# Patient Record
Sex: FEMALE | Race: WHITE | NOT HISPANIC OR LATINO | URBAN - METROPOLITAN AREA
[De-identification: names, ages, dates, MRNs, and addresses within clinical notes are randomized per-mention and may not be internally consistent; named-entity substitution may affect disease eponyms.]

---

## 2022-03-03 ENCOUNTER — LAB (OUTPATIENT)
Dept: LAB | Facility: HOSPITAL | Age: 37
End: 2022-03-03

## 2022-03-03 ENCOUNTER — OFFICE VISIT (OUTPATIENT)
Dept: INTERNAL MEDICINE | Facility: CLINIC | Age: 37
End: 2022-03-03

## 2022-03-03 VITALS
HEART RATE: 84 BPM | WEIGHT: 136.2 LBS | DIASTOLIC BLOOD PRESSURE: 70 MMHG | HEIGHT: 67 IN | SYSTOLIC BLOOD PRESSURE: 100 MMHG | OXYGEN SATURATION: 99 % | BODY MASS INDEX: 21.38 KG/M2 | TEMPERATURE: 98.6 F

## 2022-03-03 DIAGNOSIS — Z11.59 NEED FOR HEPATITIS C SCREENING TEST: ICD-10-CM

## 2022-03-03 DIAGNOSIS — Z23 NEED FOR HPV VACCINE: ICD-10-CM

## 2022-03-03 DIAGNOSIS — E05.00 GRAVES DISEASE: Chronic | ICD-10-CM

## 2022-03-03 DIAGNOSIS — Z00.00 WELL ADULT EXAM: Primary | ICD-10-CM

## 2022-03-03 PROBLEM — M79.89 SOFT TISSUE MASS: Status: ACTIVE | Noted: 2020-11-03

## 2022-03-03 PROBLEM — M79.89 SOFT TISSUE MASS: Status: RESOLVED | Noted: 2020-11-03 | Resolved: 2022-03-03

## 2022-03-03 PROBLEM — G44.019 EPISODIC CLUSTER HEADACHE, NOT INTRACTABLE: Status: RESOLVED | Noted: 2020-11-03 | Resolved: 2022-03-03

## 2022-03-03 PROBLEM — G43.909 MIGRAINE WITHOUT STATUS MIGRAINOSUS, NOT INTRACTABLE: Status: ACTIVE | Noted: 2020-11-03

## 2022-03-03 PROBLEM — Z87.898 HISTORY OF HEADACHE: Status: ACTIVE | Noted: 2020-11-03

## 2022-03-03 PROBLEM — G44.019 EPISODIC CLUSTER HEADACHE, NOT INTRACTABLE: Status: ACTIVE | Noted: 2020-11-03

## 2022-03-03 LAB
HCV AB SER DONR QL: NORMAL
T4 FREE SERPL-MCNC: 1.63 NG/DL (ref 0.93–1.7)
TSH SERPL DL<=0.05 MIU/L-ACNC: 0.52 UIU/ML (ref 0.27–4.2)

## 2022-03-03 PROCEDURE — 90471 IMMUNIZATION ADMIN: CPT | Performed by: STUDENT IN AN ORGANIZED HEALTH CARE EDUCATION/TRAINING PROGRAM

## 2022-03-03 PROCEDURE — 90651 9VHPV VACCINE 2/3 DOSE IM: CPT | Performed by: STUDENT IN AN ORGANIZED HEALTH CARE EDUCATION/TRAINING PROGRAM

## 2022-03-03 PROCEDURE — 84443 ASSAY THYROID STIM HORMONE: CPT

## 2022-03-03 PROCEDURE — 99385 PREV VISIT NEW AGE 18-39: CPT | Performed by: STUDENT IN AN ORGANIZED HEALTH CARE EDUCATION/TRAINING PROGRAM

## 2022-03-03 PROCEDURE — 84439 ASSAY OF FREE THYROXINE: CPT

## 2022-03-03 PROCEDURE — 86803 HEPATITIS C AB TEST: CPT

## 2022-03-03 RX ORDER — NORGESTIMATE AND ETHINYL ESTRADIOL 7DAYSX3 28
KIT ORAL
COMMUNITY
Start: 2022-01-23 | End: 2022-05-31

## 2022-03-03 RX ORDER — MELATONIN
1000 DAILY
COMMUNITY

## 2022-03-03 RX ORDER — LEVOTHYROXINE SODIUM 0.1 MG/1
1 TABLET ORAL
COMMUNITY
Start: 2022-01-02 | End: 2022-05-31 | Stop reason: SDUPTHER

## 2022-03-03 NOTE — PROGRESS NOTES
"Chief Complaint  Denia Rodriguez is a 36 y.o. female presenting for Annual Exam (establish care) and thyroid removed (referral to Endocrinology).     From [facility]. Moved to [place] May 2021 for her 's work.  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 Has [employer] in general management. No children.    Patient has a past medical history of Graves' disease with eye symptoms, s/p total thyroidectomy January 2021 due to labile thyroid levels.  She has been followed by endocrinology and ophthalmology in [place].    History of Present Illness  Patient is here to establish care after she and her  relocated to [place] in May 2021.    She is overall and good health, she currently has no complaints.  She had a left wrist dorsal ganglion cyst that was removed in January 2021 and also had her thyroidectomy at the same time.  No other surgeries.    Patient has received 2 doses of Gardasil and would like a third dose today.  She is COVID-19 vaccinated, but not received booster yet and declines at this time.  Also declines offer for flu vaccine.  She believes she had the Tdap for the last 2 years.    Patient has upcoming appointment with OB/GYN.  She also has upcoming appointment with ophthalmology and she is requesting referral to endocrinology for management of her thyroid condition and potentially also in setting of possible future conception.    The following portions of the patient's history were reviewed and updated as appropriate: allergies, current medications, past family history, past medical history, past social history, past surgical history and problem list.    Objective  /70 (BP Location: Left arm, Patient Position: Sitting, Cuff Size: Adult)   Pulse 84   Temp 98.6 °F (37 °C) (Temporal)   Ht 170 cm (66.93\")   Wt 61.8 kg (136 lb 3.2 oz)   SpO2 99%   BMI 21.38 kg/m²     Physical Exam  Vitals reviewed.   Constitutional:       Appearance: " Normal appearance.   HENT:      Head: Normocephalic and atraumatic.      Nose: No congestion.   Eyes:      Extraocular Movements: Extraocular movements intact.      Conjunctiva/sclera: Conjunctivae normal.   Neck:      Comments: Transverse scar of the lower anterior neck in the midline.  No palpable thyroid or nodules in this area.  Cardiovascular:      Rate and Rhythm: Normal rate and regular rhythm.      Heart sounds: Normal heart sounds. No murmur heard.      Pulmonary:      Effort: Pulmonary effort is normal.      Breath sounds: Normal breath sounds.   Abdominal:      General: There is no distension.      Palpations: Abdomen is soft. There is no mass.      Tenderness: There is no abdominal tenderness.   Musculoskeletal:      Cervical back: Neck supple. No tenderness.      Right lower leg: No edema.      Left lower leg: No edema.   Lymphadenopathy:      Cervical: No cervical adenopathy.   Skin:     General: Skin is warm and dry.   Neurological:      Mental Status: She is alert and oriented to person, place, and time. Mental status is at baseline.   Psychiatric:         Behavior: Behavior normal.         Thought Content: Thought content normal.         Assessment/Plan   1. Well adult exam  Counseled on recommendationsfor Tdap every 10 years.  Also discussed annual flu shot.  Counseled on official recommendations for exercise, including moderate intensity exercise 30 minutes a day, 5 days a week, total 2.5 hours weekly.  Patient has upcoming appointment with OB/GYN for Pap.  Patient states her lipid levels were normal in the past and defers recheck today.  Patient is nonfasting.    2. Graves disease  Refer to endocrinology as requested, will check thyroid panel today.  No follow-up appointment with me in this setting.  - Ambulatory Referral to Endocrinology  - TSH; Future  - T4, Free; Future    3. Need for HPV vaccine  Dose 3/3 administered today  - HPV Vaccine QuadriValent 3 Dose IM    4. Need for hepatitis C  screening test  We will screen as recommended.  - Hepatitis C Antibody; Future    Patient's Body mass index is 21.38 kg/m². indicating that she is within normal range (BMI 18.5-24.9). No BMI management plan needed..      Return in about 1 year (around 3/3/2023) for Annual physical.    Future Appointments       Provider Department Center    3/6/2023 8:00 AM Bentley Doll MD [facility] INTERNAL MEDICINE [facility]        Bentley Doll MD  Family Medicine  03/03/2022

## 2022-05-31 ENCOUNTER — OFFICE VISIT (OUTPATIENT)
Dept: ENDOCRINOLOGY | Facility: CLINIC | Age: 37
End: 2022-05-31

## 2022-05-31 VITALS
OXYGEN SATURATION: 98 % | BODY MASS INDEX: 20.88 KG/M2 | DIASTOLIC BLOOD PRESSURE: 66 MMHG | SYSTOLIC BLOOD PRESSURE: 104 MMHG | WEIGHT: 133 LBS | HEART RATE: 68 BPM | HEIGHT: 67 IN

## 2022-05-31 DIAGNOSIS — E07.9 THYROID EYE DISEASE: ICD-10-CM

## 2022-05-31 DIAGNOSIS — E89.0 POSTOPERATIVE HYPOTHYROIDISM: Primary | ICD-10-CM

## 2022-05-31 DIAGNOSIS — H57.89 THYROID EYE DISEASE: ICD-10-CM

## 2022-05-31 PROBLEM — H02.539 EYELID RETRACTION OR LAG: Status: ACTIVE | Noted: 2022-04-20

## 2022-05-31 PROBLEM — H02.206 LAGOPHTHALMOS OF EYELIDS OF BOTH EYES: Status: ACTIVE | Noted: 2022-04-20

## 2022-05-31 PROBLEM — H02.203 LAGOPHTHALMOS OF EYELIDS OF BOTH EYES: Status: ACTIVE | Noted: 2022-04-20

## 2022-05-31 PROBLEM — H05.20 PROPTOSIS: Status: ACTIVE | Noted: 2022-04-20

## 2022-05-31 PROCEDURE — 99244 OFF/OP CNSLTJ NEW/EST MOD 40: CPT | Performed by: INTERNAL MEDICINE

## 2022-05-31 RX ORDER — LEVOTHYROXINE SODIUM 0.1 MG/1
100 TABLET ORAL
Qty: 90 TABLET | Refills: 1 | Status: SHIPPED | OUTPATIENT
Start: 2022-05-31 | End: 2022-07-11 | Stop reason: SDUPTHER

## 2022-05-31 NOTE — PROGRESS NOTES
Chief Complaint   Patient presents with   • Graves' Disease        Referring Provider  Bentley Doll MD     HPI   Denia Rodriguez is a 36 y.o. female had concerns including Graves' Disease.     Patient here today as a new consult for history of postoperative hypothyroidism status post thyroidectomy for Graves' disease.  Surgery was completed on 1/7/2021.  She is currently on levothyroxine 100 mcg daily.  Is following with oculoplastics for Graves' ophthalmopathy.  Currently takes selenium one pill daily as well as vitamin D daily.    Graves disease was diagnosed in 5/2019 - had weight loss, irritation and dryness of her eyes, tachycardia, weakness.   Was on methimazole for some time but her levels difficult to control. Then elected for thyroidectomy.     Just started trying for pregnancy recently.     Her mom had Graves disease and thyroid nodule. Had partial thyroidectomy - unsure if it was cancer.     Past Medical History:   Diagnosis Date   • Graves disease 05/11/2020    Was on methimazole and atenolol, followed with [facility]. - s/p thyroidectomy - followed with ophthalmologist at [facility].   • History of headache 11/03/2020    Resolved after thyroidectomy 1/2021   • Hypothyroidism     postoperative, thyroidectomy 1/7/21 for Graves disease     Past Surgical History:   Procedure Laterality Date   • GANGLION CYST EXCISION Left 01/2021    Dorsal wrist   • THYROIDECTOMY  01/2021    For Graves disease, difficult to manage      Family History   Problem Relation Age of Onset   • Arthritis Mother    • Thyroid disease Mother    • Mental illness Maternal Uncle    • Mental illness Paternal Aunt    • Cancer Paternal Aunt    • Obesity Paternal Aunt    • Arthritis Maternal Grandmother       Social History     Socioeconomic History   • Marital status:    Tobacco Use   • Smoking status: Never Smoker   • Smokeless tobacco: Never Used   Vaping Use   • Vaping Use: Never used   Substance and Sexual Activity   •  "Alcohol use: Yes     Comment: 3 weekly   • Drug use: Never   • Sexual activity: Defer      No Known Allergies   Current Outpatient Medications on File Prior to Visit   Medication Sig Dispense Refill   • cholecalciferol (VITAMIN D3) 25 MCG (1000 UT) tablet Take 1,000 Units by mouth Daily.     • SELENIUM PO Take  by mouth Daily.     • [DISCONTINUED] levothyroxine (SYNTHROID, LEVOTHROID) 100 MCG tablet Take 1 tablet by mouth Every Morning Before Breakfast.     • [DISCONTINUED] Tri-Estarylla 0.18/0.215/0.25 MG-35 MCG per tablet        No current facility-administered medications on file prior to visit.        Review of Systems   Constitutional: Negative.    HENT: Negative.    Eyes:        Dryness   Respiratory: Negative.    Cardiovascular: Negative.    Gastrointestinal: Negative.    Endocrine: Negative.    Genitourinary: Negative.    Musculoskeletal: Negative.    Skin: Negative.    Allergic/Immunologic: Negative.    Neurological: Negative.    Hematological: Negative.    Psychiatric/Behavioral: Negative.         /66   Pulse 68   Ht 170.2 cm (67\")   Wt 60.3 kg (133 lb)   SpO2 98%   BMI 20.83 kg/m²      Physical Exam    Constitutional:  well developed; well nourished  no acute distress   ENT/Thyroid: Thyroid surgically absent   Eyes: EOM intact  Conjunctiva: clear, bilateral mild proptosis, lid lag, no conjunctival injection   Respiratory:  breathing is unlabored  clear to auscultation bilaterally   Cardiovascular:  regular rate and rhythm, S1, S2 normal, no murmur, click, rub or gallop   Chest:  Not performed.   Abdomen: Not performed.   : Not performed.   Musculoskeletal: negative findings:  ROM of all joints is normal, no deformities present   Skin: dry and warm   Neuro: normal without focal findings and mental status, speech normal, alert and oriented x3   Psych: oriented to time, place and person, mood and affect are within normal limits     Labs/Imaging  CMP  Lab Results   Component Value Date    BUN 10 " 06/24/2021    CREATININE 0.77 06/24/2021    EGFRIFNONA >60 06/24/2021    EGFRIFAFRI >60 06/24/2021    BCR 13 06/24/2021    K 4.1 06/24/2021    CO2 25 06/24/2021    CALCIUM 9.1 06/24/2021    ALBUMIN 4.1 01/27/2021    AST 16 01/27/2021    ALT 18 01/27/2021        CBC w/DIFF   Lab Results   Component Value Date    WBC 6.1 01/27/2021    RBC 4.21 01/27/2021    HGB 12.4 (L) 01/27/2021    HCT 37.5 01/27/2021    MCV 89 01/27/2021    MCH 29.5 01/27/2021    MCHC 33.1 01/27/2021    RDW 12.4 01/27/2021    MPV 9.0 01/27/2021     01/27/2021    NEUTRORELPCT 50.0 01/27/2021    LYMPHORELPCT 40.0 01/27/2021    MONORELPCT 7.0 01/27/2021    EOSRELPCT 2.0 01/27/2021    BASORELPCT 0.7 01/27/2021    AUTOIGPER 0.3 01/27/2021    NEUTROABS 3.06 01/27/2021    LYMPHSABS 2.45 01/27/2021    MONOSABS 0.43 01/27/2021    EOSABS 0.12 01/27/2021    BASOSABS 0.04 01/27/2021    AUTOIGNUM 0.02 01/27/2021    NRBC 0.0 01/27/2021     TSH  Lab Results   Component Value Date    TSH 0.516 03/03/2022    TSH 0.86 01/27/2021    TSH <0.02 (L) 12/29/2020     T4  Lab Results   Component Value Date    FREET4 1.63 03/03/2022    FREET4 1.6 01/27/2021    FREET4 0.9 12/29/2020     T3  Lab Results   Component Value Date    T3FREE >32.55 (H) 05/17/2019       Assessment and Plan    Diagnoses and all orders for this visit:    1. Postoperative hypothyroidism (Primary)  Status post complete thyroidectomy 1/7/2021 for Graves' hyperthyroidism.  Clinically and biochemically euthyroid on levothyroxine 100 mcg daily.  Taking daily in the morning as directed, no missed doses.  TFTs in an optimal range from March.  Refill was sent to the pharmacy.   Patient is now actively trying for pregnancy.  I counseled on management of hypothyroidism in pregnancy - Double the dose of levothyroxine two days per week and contact the office as soon as a home pregnancy test is positive for close monitoring of TFTs in pregnancy. Can check TSHrAb in third trimester.   -     levothyroxine  (SYNTHROID, LEVOTHROID) 100 MCG tablet; Take 1 tablet by mouth Every Morning Before Breakfast.  Dispense: 90 tablet; Refill: 1    2. Thyroid eye disease  On selenium and vitamin D.  Following with Dr. Brower every 6 months.  No active Graves' eye disease noted during the visit today.  Continue follow-up with Dr. Brower.       Return in about 6 months (around 11/30/2022) for next scheduled follow up. The patient was instructed to contact the clinic with any interval questions or concerns.    Florina Hull, DO   Endocrinologist    Please note that portions of this note were completed with a voice recognition program.

## 2022-07-11 ENCOUNTER — TELEPHONE (OUTPATIENT)
Dept: ENDOCRINOLOGY | Facility: CLINIC | Age: 37
End: 2022-07-11

## 2022-07-11 DIAGNOSIS — E89.0 POSTOPERATIVE HYPOTHYROIDISM: Primary | ICD-10-CM

## 2022-07-11 RX ORDER — LEVOTHYROXINE SODIUM 0.1 MG/1
100 TABLET ORAL
Qty: 90 TABLET | Refills: 1
Start: 2022-07-11 | End: 2022-07-25 | Stop reason: SDUPTHER

## 2022-07-11 NOTE — TELEPHONE ENCOUNTER
Called pt and her positive test was a home test. She has appt this Friday with OB to confirm.    Please advise.

## 2022-07-11 NOTE — TELEPHONE ENCOUNTER
Patient has a positive pregnancy test and wants to know how to adjust her thyroid medication.  Please advise?  Thank you.

## 2022-07-11 NOTE — TELEPHONE ENCOUNTER
Congratulations!    Take 2 additional pills of levothyroxine per week (2 days a week- take 2 tabs).  Lets recheck labs now.  I will put a lab order in the system.

## 2022-07-14 ENCOUNTER — LAB (OUTPATIENT)
Dept: LAB | Facility: HOSPITAL | Age: 37
End: 2022-07-14

## 2022-07-14 ENCOUNTER — LAB (OUTPATIENT)
Dept: ENDOCRINOLOGY | Facility: CLINIC | Age: 37
End: 2022-07-14

## 2022-07-14 ENCOUNTER — TRANSCRIBE ORDERS (OUTPATIENT)
Dept: LAB | Facility: HOSPITAL | Age: 37
End: 2022-07-14

## 2022-07-14 DIAGNOSIS — E89.0 POSTOPERATIVE HYPOTHYROIDISM: ICD-10-CM

## 2022-07-14 DIAGNOSIS — N92.6 IRREGULAR MENSTRUAL CYCLE: Primary | ICD-10-CM

## 2022-07-14 DIAGNOSIS — N92.6 IRREGULAR MENSTRUAL CYCLE: ICD-10-CM

## 2022-07-14 LAB
HCG INTACT+B SERPL-ACNC: 9980 MIU/ML
T4 SERPL-MCNC: 10.3 MCG/DL (ref 4.5–11.7)
TSH SERPL DL<=0.05 MIU/L-ACNC: 1.37 UIU/ML (ref 0.27–4.2)

## 2022-07-14 PROCEDURE — 36415 COLL VENOUS BLD VENIPUNCTURE: CPT

## 2022-07-14 PROCEDURE — 84443 ASSAY THYROID STIM HORMONE: CPT | Performed by: INTERNAL MEDICINE

## 2022-07-14 PROCEDURE — 84702 CHORIONIC GONADOTROPIN TEST: CPT | Performed by: ADVANCED PRACTICE MIDWIFE

## 2022-07-14 PROCEDURE — 84436 ASSAY OF TOTAL THYROXINE: CPT | Performed by: INTERNAL MEDICINE

## 2022-07-16 ENCOUNTER — TRANSCRIBE ORDERS (OUTPATIENT)
Dept: LAB | Facility: HOSPITAL | Age: 37
End: 2022-07-16

## 2022-07-16 ENCOUNTER — LAB (OUTPATIENT)
Dept: LAB | Facility: HOSPITAL | Age: 37
End: 2022-07-16

## 2022-07-16 DIAGNOSIS — N92.6 IRREGULAR MENSTRUAL CYCLE: Primary | ICD-10-CM

## 2022-07-16 DIAGNOSIS — N92.6 IRREGULAR MENSTRUAL CYCLE: ICD-10-CM

## 2022-07-16 DIAGNOSIS — E05.00 BASEDOW'S DISEASE: ICD-10-CM

## 2022-07-16 LAB — HCG INTACT+B SERPL-ACNC: NORMAL MIU/ML

## 2022-07-16 PROCEDURE — 36415 COLL VENOUS BLD VENIPUNCTURE: CPT

## 2022-07-16 PROCEDURE — 84702 CHORIONIC GONADOTROPIN TEST: CPT

## 2022-07-25 DIAGNOSIS — E89.0 POSTOPERATIVE HYPOTHYROIDISM: ICD-10-CM

## 2022-07-25 RX ORDER — LEVOTHYROXINE SODIUM 0.1 MG/1
TABLET ORAL
Qty: 100 TABLET | Refills: 1 | Status: SHIPPED | OUTPATIENT
Start: 2022-07-25 | End: 2022-08-16 | Stop reason: SDUPTHER

## 2022-08-15 ENCOUNTER — TRANSCRIBE ORDERS (OUTPATIENT)
Dept: LAB | Facility: HOSPITAL | Age: 37
End: 2022-08-15

## 2022-08-15 ENCOUNTER — LAB (OUTPATIENT)
Dept: LAB | Facility: HOSPITAL | Age: 37
End: 2022-08-15

## 2022-08-15 DIAGNOSIS — Z3A.09 9 WEEKS GESTATION OF PREGNANCY: ICD-10-CM

## 2022-08-15 DIAGNOSIS — Z3A.09 9 WEEKS GESTATION OF PREGNANCY: Primary | ICD-10-CM

## 2022-08-15 DIAGNOSIS — E89.0 POSTOPERATIVE HYPOTHYROIDISM: ICD-10-CM

## 2022-08-15 LAB
ABO GROUP BLD: NORMAL
AMPHET+METHAMPHET UR QL: NEGATIVE
AMPHETAMINES UR QL: NEGATIVE
BARBITURATES UR QL SCN: NEGATIVE
BENZODIAZ UR QL SCN: NEGATIVE
BILIRUB UR QL STRIP: NEGATIVE
BUPRENORPHINE SERPL-MCNC: NEGATIVE NG/ML
CANNABINOIDS SERPL QL: NEGATIVE
CLARITY UR: CLEAR
COCAINE UR QL: NEGATIVE
COLOR UR: YELLOW
DEPRECATED RDW RBC AUTO: 43.4 FL (ref 37–54)
ERYTHROCYTE [DISTWIDTH] IN BLOOD BY AUTOMATED COUNT: 13.3 % (ref 12.3–15.4)
GLUCOSE UR STRIP-MCNC: NEGATIVE MG/DL
HBV SURFACE AG SERPL QL IA: NORMAL
HCT VFR BLD AUTO: 39.9 % (ref 34–46.6)
HGB BLD-MCNC: 13.2 G/DL (ref 12–15.9)
HGB UR QL STRIP.AUTO: NEGATIVE
HIV1+2 AB SER QL: NORMAL
KETONES UR QL STRIP: NEGATIVE
LEUKOCYTE ESTERASE UR QL STRIP.AUTO: ABNORMAL
MCH RBC QN AUTO: 29.8 PG (ref 26.6–33)
MCHC RBC AUTO-ENTMCNC: 33.1 G/DL (ref 31.5–35.7)
MCV RBC AUTO: 90.1 FL (ref 79–97)
METHADONE UR QL SCN: NEGATIVE
NITRITE UR QL STRIP: NEGATIVE
OPIATES UR QL: NEGATIVE
OXYCODONE UR QL SCN: NEGATIVE
PCP UR QL SCN: NEGATIVE
PH UR STRIP.AUTO: 6 [PH] (ref 5–8)
PLATELET # BLD AUTO: 314 10*3/MM3 (ref 140–450)
PMV BLD AUTO: 9.7 FL (ref 6–12)
PROPOXYPH UR QL: NEGATIVE
PROT UR QL STRIP: NEGATIVE
RBC # BLD AUTO: 4.43 10*6/MM3 (ref 3.77–5.28)
RH BLD: POSITIVE
SP GR UR STRIP: 1.02 (ref 1–1.03)
TRICYCLICS UR QL SCN: NEGATIVE
UROBILINOGEN UR QL STRIP: ABNORMAL
WBC NRBC COR # BLD: 6.78 10*3/MM3 (ref 3.4–10.8)

## 2022-08-15 PROCEDURE — 84443 ASSAY THYROID STIM HORMONE: CPT

## 2022-08-15 PROCEDURE — 87591 N.GONORRHOEAE DNA AMP PROB: CPT

## 2022-08-15 PROCEDURE — G0432 EIA HIV-1/HIV-2 SCREEN: HCPCS

## 2022-08-15 PROCEDURE — 80306 DRUG TEST PRSMV INSTRMNT: CPT

## 2022-08-15 PROCEDURE — 36415 COLL VENOUS BLD VENIPUNCTURE: CPT

## 2022-08-15 PROCEDURE — 87340 HEPATITIS B SURFACE AG IA: CPT

## 2022-08-15 PROCEDURE — 84436 ASSAY OF TOTAL THYROXINE: CPT

## 2022-08-15 PROCEDURE — 87086 URINE CULTURE/COLONY COUNT: CPT

## 2022-08-15 PROCEDURE — 86762 RUBELLA ANTIBODY: CPT

## 2022-08-15 PROCEDURE — 86780 TREPONEMA PALLIDUM: CPT

## 2022-08-15 PROCEDURE — 81001 URINALYSIS AUTO W/SCOPE: CPT

## 2022-08-15 PROCEDURE — 86900 BLOOD TYPING SEROLOGIC ABO: CPT

## 2022-08-15 PROCEDURE — 87491 CHLMYD TRACH DNA AMP PROBE: CPT

## 2022-08-15 PROCEDURE — 86787 VARICELLA-ZOSTER ANTIBODY: CPT

## 2022-08-15 PROCEDURE — 86901 BLOOD TYPING SEROLOGIC RH(D): CPT

## 2022-08-15 PROCEDURE — 86803 HEPATITIS C AB TEST: CPT

## 2022-08-15 PROCEDURE — 85027 COMPLETE CBC AUTOMATED: CPT

## 2022-08-16 DIAGNOSIS — E89.0 POSTOPERATIVE HYPOTHYROIDISM: ICD-10-CM

## 2022-08-16 LAB
BACTERIA UR QL AUTO: ABNORMAL /HPF
HCV AB SER DONR QL: NORMAL
HYALINE CASTS UR QL AUTO: ABNORMAL /LPF
RBC # UR STRIP: ABNORMAL /HPF
REF LAB TEST METHOD: ABNORMAL
SQUAMOUS #/AREA URNS HPF: ABNORMAL /HPF
T4 SERPL-MCNC: 14.3 MCG/DL (ref 4.5–11.7)
TSH SERPL DL<=0.05 MIU/L-ACNC: 0.63 UIU/ML (ref 0.27–4.2)
WBC # UR STRIP: ABNORMAL /HPF

## 2022-08-16 RX ORDER — LEVOTHYROXINE SODIUM 0.12 MG/1
125 TABLET ORAL DAILY
Qty: 30 TABLET | Refills: 3 | Status: SHIPPED | OUTPATIENT
Start: 2022-08-16 | End: 2022-09-13 | Stop reason: SDUPTHER

## 2022-08-17 LAB
BACTERIA SPEC AEROBE CULT: NO GROWTH
RUBV IGG SERPL IA-ACNC: <0.9 INDEX
T PALLIDUM AB SER QL IF: NON REACTIVE
VZV IGG SER IA-ACNC: 344 INDEX

## 2022-08-19 LAB
C TRACH RRNA SPEC QL NAA+PROBE: NEGATIVE
N GONORRHOEA RRNA SPEC QL NAA+PROBE: NEGATIVE

## 2022-09-12 ENCOUNTER — LAB (OUTPATIENT)
Dept: LAB | Facility: HOSPITAL | Age: 37
End: 2022-09-12

## 2022-09-12 DIAGNOSIS — E89.0 POSTOPERATIVE HYPOTHYROIDISM: ICD-10-CM

## 2022-09-12 PROCEDURE — 84443 ASSAY THYROID STIM HORMONE: CPT

## 2022-09-12 PROCEDURE — 84436 ASSAY OF TOTAL THYROXINE: CPT

## 2022-09-12 PROCEDURE — 36415 COLL VENOUS BLD VENIPUNCTURE: CPT

## 2022-09-13 DIAGNOSIS — E89.0 POSTOPERATIVE HYPOTHYROIDISM: ICD-10-CM

## 2022-09-13 LAB
T4 SERPL-MCNC: 16.2 MCG/DL (ref 4.5–11.7)
TSH SERPL DL<=0.05 MIU/L-ACNC: 0.26 UIU/ML (ref 0.27–4.2)

## 2022-09-13 RX ORDER — LEVOTHYROXINE SODIUM 112 UG/1
112 TABLET ORAL DAILY
Qty: 30 TABLET | Refills: 4 | Status: SHIPPED | OUTPATIENT
Start: 2022-09-13 | End: 2022-10-26 | Stop reason: SDUPTHER

## 2022-10-21 ENCOUNTER — LAB (OUTPATIENT)
Dept: LAB | Facility: HOSPITAL | Age: 37
End: 2022-10-21

## 2022-10-21 ENCOUNTER — TRANSCRIBE ORDERS (OUTPATIENT)
Dept: LAB | Facility: HOSPITAL | Age: 37
End: 2022-10-21

## 2022-10-21 DIAGNOSIS — Z34.82 PRENATAL CARE, SUBSEQUENT PREGNANCY, SECOND TRIMESTER: Primary | ICD-10-CM

## 2022-10-21 LAB
T4 FREE SERPL-MCNC: 1.47 NG/DL (ref 0.93–1.7)
TSH SERPL DL<=0.05 MIU/L-ACNC: 0.89 UIU/ML (ref 0.27–4.2)

## 2022-10-21 PROCEDURE — 84439 ASSAY OF FREE THYROXINE: CPT | Performed by: OBSTETRICS & GYNECOLOGY

## 2022-10-21 PROCEDURE — 84443 ASSAY THYROID STIM HORMONE: CPT | Performed by: OBSTETRICS & GYNECOLOGY

## 2022-10-21 PROCEDURE — 36415 COLL VENOUS BLD VENIPUNCTURE: CPT | Performed by: OBSTETRICS & GYNECOLOGY

## 2022-10-26 ENCOUNTER — OFFICE VISIT (OUTPATIENT)
Dept: ENDOCRINOLOGY | Facility: CLINIC | Age: 37
End: 2022-10-26

## 2022-10-26 VITALS
BODY MASS INDEX: 22.91 KG/M2 | DIASTOLIC BLOOD PRESSURE: 60 MMHG | HEIGHT: 67 IN | HEART RATE: 83 BPM | SYSTOLIC BLOOD PRESSURE: 106 MMHG | OXYGEN SATURATION: 97 % | WEIGHT: 146 LBS

## 2022-10-26 DIAGNOSIS — E89.0 POSTOPERATIVE HYPOTHYROIDISM: Primary | ICD-10-CM

## 2022-10-26 PROCEDURE — 99213 OFFICE O/P EST LOW 20 MIN: CPT | Performed by: INTERNAL MEDICINE

## 2022-10-26 RX ORDER — LEVOTHYROXINE SODIUM 112 UG/1
112 TABLET ORAL DAILY
Qty: 90 TABLET | Refills: 1 | Status: SHIPPED | OUTPATIENT
Start: 2022-10-26 | End: 2022-12-14 | Stop reason: SDUPTHER

## 2022-10-26 NOTE — PROGRESS NOTES
"Chief Complaint   Patient presents with   • Hypothyroidism        HPI   Denia Rodriguez is a 37 y.o. female had concerns including Hypothyroidism.      Patient currently 20 weeks pregnant.    Is on levothyroxine 112 mcg daily.  TFTs few days ago were normal.  She is taking the levothyroxine with her prenatal in the mornings.  Denies missed doses.    The following portions of the patient's history were reviewed and updated as appropriate: allergies, current medications, past family history, past medical history, past social history, past surgical history and problem list.    Review of Systems   Constitutional: Negative.    Endocrine: Negative.         /60 (BP Location: Left arm, Patient Position: Sitting, Cuff Size: Adult)   Pulse 83   Ht 170.2 cm (67\")   Wt 66.2 kg (146 lb)   SpO2 97%   BMI 22.87 kg/m²      Physical Exam  Vitals reviewed.   Constitutional:       Appearance: Normal appearance.   Cardiovascular:      Rate and Rhythm: Normal rate.   Pulmonary:      Effort: Pulmonary effort is normal.   Neurological:      General: No focal deficit present.      Mental Status: She is alert. Mental status is at baseline.   Psychiatric:         Mood and Affect: Mood normal.         Behavior: Behavior normal.              LABS AND IMAGING   CMP  Lab Results   Component Value Date    BUN 10 06/24/2021    CREATININE 0.77 06/24/2021    EGFRIFNONA >60 06/24/2021    EGFRIFAFRI >60 06/24/2021    BCR 13 06/24/2021    K 4.1 06/24/2021    CO2 25 06/24/2021    CALCIUM 9.1 06/24/2021    ALBUMIN 4.1 01/27/2021    AST 16 01/27/2021    ALT 18 01/27/2021        CBC w/DIFF   Lab Results   Component Value Date    WBC 6.78 08/15/2022    RBC 4.43 08/15/2022    HGB 13.2 08/15/2022    HCT 39.9 08/15/2022    MCV 90.1 08/15/2022    MCH 29.8 08/15/2022    MCHC 33.1 08/15/2022    RDW 13.3 08/15/2022    RDWSD 43.4 08/15/2022    MPV 9.7 08/15/2022     08/15/2022    NEUTRORELPCT 50.0 01/27/2021    LYMPHORELPCT 40.0 01/27/2021    " MONORELPCT 7.0 01/27/2021    EOSRELPCT 2.0 01/27/2021    BASORELPCT 0.7 01/27/2021    AUTOIGPER 0.3 01/27/2021    NEUTROABS 3.06 01/27/2021    LYMPHSABS 2.45 01/27/2021    MONOSABS 0.43 01/27/2021    EOSABS 0.12 01/27/2021    BASOSABS 0.04 01/27/2021    AUTOIGNUM 0.02 01/27/2021    NRBC 0.0 01/27/2021       TSH  Lab Results   Component Value Date    TSH 0.890 10/21/2022    TSH 0.256 (L) 09/12/2022    TSH 0.626 08/15/2022       T4  Lab Results   Component Value Date    FREET4 1.47 10/21/2022    FREET4 1.63 03/03/2022    FREET4 1.6 01/27/2021     Lab Results   Component Value Date    R2SVPHX 16.20 (H) 09/12/2022    A6DJLQC 14.30 (H) 08/15/2022    S2YPKKN 10.30 07/14/2022       T3  Lab Results   Component Value Date    T3FREE >32.55 (H) 05/17/2019     Assessment and Plan    Diagnoses and all orders for this visit:    1. Postoperative hypothyroidism (Primary)  Controlled at 20 weeks pregnant on levothyroxine 112 mcg daily.  Prepregnancy dose was 100 mcg daily.  Continue taking as she has been (currently taking with multivitamin, but with normal TFTs, no need to change).  Recheck in 3 months.  -     levothyroxine (SYNTHROID, LEVOTHROID) 112 MCG tablet; Take 1 tablet by mouth Daily.  Dispense: 90 tablet; Refill: 1  -     T4, Free; Future  -     TSH; Future         Return in about 3 months (around 1/26/2023) for next scheduled follow up. The patient was instructed to contact the clinic with any interval questions or concerns.    Florina Hull, DO   Endocrinologist    Please note that portions of this note were completed with a voice recognition program.

## 2022-11-22 ENCOUNTER — TELEPHONE (OUTPATIENT)
Dept: INTERNAL MEDICINE | Facility: CLINIC | Age: 37
End: 2022-11-22

## 2022-11-22 ENCOUNTER — OFFICE VISIT (OUTPATIENT)
Dept: INTERNAL MEDICINE | Facility: CLINIC | Age: 37
End: 2022-11-22

## 2022-11-22 VITALS
SYSTOLIC BLOOD PRESSURE: 106 MMHG | HEART RATE: 96 BPM | BODY MASS INDEX: 23.23 KG/M2 | WEIGHT: 148 LBS | TEMPERATURE: 98 F | HEIGHT: 67 IN | DIASTOLIC BLOOD PRESSURE: 68 MMHG

## 2022-11-22 DIAGNOSIS — J10.1 UPPER RESPIRATORY TRACT INFECTION DUE TO INFLUENZA A VIRUS: Primary | ICD-10-CM

## 2022-11-22 DIAGNOSIS — Z34.90 PREGNANCY, UNSPECIFIED GESTATIONAL AGE: ICD-10-CM

## 2022-11-22 LAB
EXPIRATION DATE: ABNORMAL
FLUAV AG UPPER RESP QL IA.RAPID: DETECTED
FLUBV AG UPPER RESP QL IA.RAPID: NOT DETECTED
INTERNAL CONTROL: ABNORMAL
Lab: ABNORMAL
SARS-COV-2 AG UPPER RESP QL IA.RAPID: NOT DETECTED

## 2022-11-22 PROCEDURE — 99214 OFFICE O/P EST MOD 30 MIN: CPT | Performed by: STUDENT IN AN ORGANIZED HEALTH CARE EDUCATION/TRAINING PROGRAM

## 2022-11-22 PROCEDURE — 87428 SARSCOV & INF VIR A&B AG IA: CPT | Performed by: STUDENT IN AN ORGANIZED HEALTH CARE EDUCATION/TRAINING PROGRAM

## 2022-11-22 NOTE — TELEPHONE ENCOUNTER
Patient called stating that both her and her  want to be tested for COVID and flu because they have fever and congestion. They had plans to travel to see family for thanksgiving    I let her know that Dr Doll had an 11:30 AM and 4:15 PM. She stated that since they would have to be seen they will look into If a local testing center will test them and she will call back if needed

## 2022-11-22 NOTE — PROGRESS NOTES
"Chief Complaint  Denia Rodriguez is a 37 y.o. female presenting for URI (Fatigue, fever, post nasal drip, X's 2 days   bodyaches X's 5 days ).     Patient has a past medical history of Graves' disease, postoperative hypothyroidism, proptosis and headaches.    History of Present Illness  Patient is here due to respiratory symptoms that started Friday, 11/18/2022.  Initially body aches, then cough, some yellow-clear phlegm, no wheezing but mild shortness of breath.  Appetite is good, tolerating p.o.  Also had fever last night.  She is pregnant 5 months with NICOLASA 3/15/2022.  Fetal movement present.  Of note her  had a cough last week.    The following portions of the patient's history were reviewed and updated as appropriate: allergies, current medications, past family history, past medical history, past social history, past surgical history and problem list.    Objective  /68 (BP Location: Left arm, Patient Position: Sitting, Cuff Size: Adult)   Pulse 96 Comment: irregular   skipped beat  Temp 98 °F (36.7 °C) (Temporal)   Ht 170.2 cm (67.01\")   Wt 67.1 kg (148 lb)   BMI 23.17 kg/m²     Physical Exam  Vitals reviewed.   Constitutional:       General: She is not in acute distress.     Appearance: Normal appearance. She is ill-appearing. She is not toxic-appearing or diaphoretic.   HENT:      Head: Normocephalic and atraumatic.      Right Ear: Tympanic membrane, ear canal and external ear normal. There is no impacted cerumen.      Left Ear: Tympanic membrane, ear canal and external ear normal. There is no impacted cerumen.      Nose: Congestion present.   Eyes:      Extraocular Movements: Extraocular movements intact.      Conjunctiva/sclera: Conjunctivae normal.   Cardiovascular:      Rate and Rhythm: Normal rate and regular rhythm.      Heart sounds: Normal heart sounds. No murmur heard.  Pulmonary:      Effort: Pulmonary effort is normal. No respiratory distress.      Breath sounds: Normal breath " sounds. No wheezing.   Musculoskeletal:      Cervical back: Neck supple.   Skin:     General: Skin is warm and dry.   Neurological:      Mental Status: She is alert and oriented to person, place, and time. Mental status is at baseline.   Psychiatric:         Behavior: Behavior normal.         Thought Content: Thought content normal.         Assessment/Plan   1. Upper respiratory tract infection due to influenza A virus  Confirmed positive influenza A.  Patient is pregnant and is not really immunosuppressed because of that.  Because of her pregnancy I would not recommend any prescription cough syrup.  Also treatment with antiviral like Tamiflu is not recommended past 48 hours since symptoms started.  Patient has systemic symptoms in form of body aches and fever, but is otherwise fairly well-appearing.  She can safely use saline nasal spray for relief of congestion, or Tylenol for any significant aches or fever.  Counseled on return to care if worsening symptoms, sometimes this can be concerning for secondary infection like pneumonia.  - POCT SARS-CoV-2 Antigen ROYAL    2. Pregnancy, unspecified gestational age  Continue follow-up with OB/GYN.      Return if symptoms worsen or fail to improve.    Future Appointments       Provider Department Center    1/26/2023 10:00 AM Florina Hull DO [facility] ENDOCRINOLOGY [facility]    3/6/2023 8:00 AM Bentley Doll MD [facility] INTERNAL MEDICINE [facility]            Bentley Doll MD  Family Medicine  11/22/2022

## 2022-12-12 ENCOUNTER — LAB (OUTPATIENT)
Dept: LAB | Facility: HOSPITAL | Age: 37
End: 2022-12-12

## 2022-12-12 ENCOUNTER — TRANSCRIBE ORDERS (OUTPATIENT)
Dept: LAB | Facility: HOSPITAL | Age: 37
End: 2022-12-12

## 2022-12-12 DIAGNOSIS — E03.9 MYXEDEMA HEART DISEASE: ICD-10-CM

## 2022-12-12 DIAGNOSIS — E03.9 MYXEDEMA HEART DISEASE: Primary | ICD-10-CM

## 2022-12-12 DIAGNOSIS — I51.9 MYXEDEMA HEART DISEASE: ICD-10-CM

## 2022-12-12 DIAGNOSIS — I51.9 MYXEDEMA HEART DISEASE: Primary | ICD-10-CM

## 2022-12-12 LAB
BLD GP AB SCN SERPL QL: NEGATIVE
GLUCOSE 1H P 100 G GLC PO SERPL-MCNC: 113 MG/DL (ref 65–139)

## 2022-12-12 PROCEDURE — 82950 GLUCOSE TEST: CPT

## 2022-12-12 PROCEDURE — 86850 RBC ANTIBODY SCREEN: CPT

## 2022-12-12 PROCEDURE — 84443 ASSAY THYROID STIM HORMONE: CPT

## 2022-12-12 PROCEDURE — 84439 ASSAY OF FREE THYROXINE: CPT

## 2022-12-12 PROCEDURE — 85027 COMPLETE CBC AUTOMATED: CPT

## 2022-12-12 PROCEDURE — 36415 COLL VENOUS BLD VENIPUNCTURE: CPT

## 2022-12-13 LAB
DEPRECATED RDW RBC AUTO: 41 FL (ref 37–54)
ERYTHROCYTE [DISTWIDTH] IN BLOOD BY AUTOMATED COUNT: 12.9 % (ref 12.3–15.4)
HCT VFR BLD AUTO: 35.1 % (ref 34–46.6)
HGB BLD-MCNC: 11.9 G/DL (ref 12–15.9)
MCH RBC QN AUTO: 29.6 PG (ref 26.6–33)
MCHC RBC AUTO-ENTMCNC: 33.9 G/DL (ref 31.5–35.7)
MCV RBC AUTO: 87.3 FL (ref 79–97)
PLATELET # BLD AUTO: 409 10*3/MM3 (ref 140–450)
PMV BLD AUTO: 8.9 FL (ref 6–12)
RBC # BLD AUTO: 4.02 10*6/MM3 (ref 3.77–5.28)
T4 FREE SERPL-MCNC: 1.43 NG/DL (ref 0.93–1.7)
TSH SERPL DL<=0.05 MIU/L-ACNC: 0.1 UIU/ML (ref 0.27–4.2)
WBC NRBC COR # BLD: 7.01 10*3/MM3 (ref 3.4–10.8)

## 2022-12-14 ENCOUNTER — PATIENT MESSAGE (OUTPATIENT)
Dept: ENDOCRINOLOGY | Facility: CLINIC | Age: 37
End: 2022-12-14

## 2022-12-14 DIAGNOSIS — E89.0 POSTOPERATIVE HYPOTHYROIDISM: ICD-10-CM

## 2022-12-14 RX ORDER — LEVOTHYROXINE SODIUM 0.1 MG/1
100 TABLET ORAL DAILY
Qty: 30 TABLET | Refills: 5 | Status: SHIPPED | OUTPATIENT
Start: 2022-12-14 | End: 2023-02-15 | Stop reason: SDUPTHER

## 2023-02-11 ENCOUNTER — LAB (OUTPATIENT)
Dept: LAB | Facility: HOSPITAL | Age: 38
End: 2023-02-11
Payer: COMMERCIAL

## 2023-02-11 DIAGNOSIS — E89.0 POSTOPERATIVE HYPOTHYROIDISM: ICD-10-CM

## 2023-02-11 LAB
T4 FREE SERPL-MCNC: 1.39 NG/DL (ref 0.93–1.7)
TSH SERPL DL<=0.05 MIU/L-ACNC: 0.46 UIU/ML (ref 0.27–4.2)

## 2023-02-11 PROCEDURE — 36415 COLL VENOUS BLD VENIPUNCTURE: CPT

## 2023-02-11 PROCEDURE — 84439 ASSAY OF FREE THYROXINE: CPT

## 2023-02-11 PROCEDURE — 84443 ASSAY THYROID STIM HORMONE: CPT

## 2023-02-15 ENCOUNTER — OFFICE VISIT (OUTPATIENT)
Dept: ENDOCRINOLOGY | Facility: CLINIC | Age: 38
End: 2023-02-15
Payer: COMMERCIAL

## 2023-02-15 VITALS
SYSTOLIC BLOOD PRESSURE: 100 MMHG | HEIGHT: 67 IN | BODY MASS INDEX: 23.54 KG/M2 | DIASTOLIC BLOOD PRESSURE: 66 MMHG | HEART RATE: 98 BPM | WEIGHT: 150 LBS | OXYGEN SATURATION: 98 %

## 2023-02-15 DIAGNOSIS — E89.0 POSTOPERATIVE HYPOTHYROIDISM: Primary | ICD-10-CM

## 2023-02-15 PROCEDURE — 99213 OFFICE O/P EST LOW 20 MIN: CPT | Performed by: INTERNAL MEDICINE

## 2023-02-15 RX ORDER — LEVOTHYROXINE SODIUM 0.1 MG/1
100 TABLET ORAL DAILY
Qty: 90 TABLET | Refills: 1 | Status: SHIPPED | OUTPATIENT
Start: 2023-02-15 | End: 2023-03-17 | Stop reason: SDUPTHER

## 2023-02-15 NOTE — PROGRESS NOTES
"Chief Complaint   Patient presents with   • Hypothyroidism        HPI   Denia Gage is a 37 y.o. female had concerns including Hypothyroidism.     Here for follow-up on hypothyroidism.  TFTs checked recently are in optimal ranges.  She is 36 weeks pregnant on levothyroxine 100 mcg daily.  Dose was decreased in December for suppressed TSH at 0.1.  Prepregnancy dose was 100 mcg daily.    The following portions of the patient's history were reviewed and updated as appropriate: allergies, current medications, past family history, past medical history, past social history, past surgical history and problem list.    Review of Systems   Constitutional: Positive for fatigue.        /66   Pulse 98   Ht 170.2 cm (67\")   Wt 68 kg (150 lb)   SpO2 98%   BMI 23.49 kg/m²      Physical Exam  Vitals reviewed.   Constitutional:       Appearance: Normal appearance.   Cardiovascular:      Rate and Rhythm: Normal rate.   Pulmonary:      Effort: Pulmonary effort is normal.   Abdominal:      Comments: gravid   Neurological:      General: No focal deficit present.      Mental Status: She is alert. Mental status is at baseline.   Psychiatric:         Mood and Affect: Mood normal.         Behavior: Behavior normal.              LABS AND IMAGING   CMP  Lab Results   Component Value Date    BUN 10 06/24/2021    CREATININE 0.77 06/24/2021    EGFRIFNONA >60 06/24/2021    EGFRIFAFRI >60 06/24/2021    BCR 13 06/24/2021    K 4.1 06/24/2021    CO2 25 06/24/2021    CALCIUM 9.1 06/24/2021    ALBUMIN 4.1 01/27/2021    AST 16 01/27/2021    ALT 18 01/27/2021        CBC w/DIFF   Lab Results   Component Value Date    WBC 7.01 12/12/2022    RBC 4.02 12/12/2022    HGB 11.9 (L) 12/12/2022    HCT 35.1 12/12/2022    MCV 87.3 12/12/2022    MCH 29.6 12/12/2022    MCHC 33.9 12/12/2022    RDW 12.9 12/12/2022    RDWSD 41.0 12/12/2022    MPV 8.9 12/12/2022     12/12/2022    NEUTRORELPCT 50.0 01/27/2021    LYMPHORELPCT 40.0 01/27/2021    " MONORELPCT 7.0 01/27/2021    EOSRELPCT 2.0 01/27/2021    BASORELPCT 0.7 01/27/2021    AUTOIGPER 0.3 01/27/2021    NEUTROABS 3.06 01/27/2021    LYMPHSABS 2.45 01/27/2021    MONOSABS 0.43 01/27/2021    EOSABS 0.12 01/27/2021    BASOSABS 0.04 01/27/2021    AUTOIGNUM 0.02 01/27/2021    NRBC 0.0 01/27/2021     TSH  Lab Results   Component Value Date    TSH 0.462 02/11/2023    TSH 0.105 (L) 12/12/2022    TSH 0.890 10/21/2022     T4  Lab Results   Component Value Date    FREET4 1.39 02/11/2023    FREET4 1.43 12/12/2022    FREET4 1.47 10/21/2022     Lab Results   Component Value Date    N2ZWWEU 16.20 (H) 09/12/2022    Y6OLSVD 14.30 (H) 08/15/2022    N0GKBHM 10.30 07/14/2022     T3  Lab Results   Component Value Date    T3FREE >32.55 (H) 05/17/2019     Assessment and Plan    Diagnoses and all orders for this visit:    1. Postoperative hypothyroidism (Primary)  S/p thyroidectomy 1/7/2021 for Graves disease with proptosis.  Euthyroid on levothyroxine 100 mcg daily. Was on higher dose for a bit during pregnancy but TSH became suppressed and dose reduced 12/2022. This is her prepregnancy dose also.   Continue current dose postpartum. Check TFTs about 6 weeks after delivery. Cautioned for symptoms of hyperthyroidism in case she requires further dose reduction after delivery.        Return in about 3 months (around 5/15/2023) for next scheduled follow up. The patient was instructed to contact the clinic with any interval questions or concerns.    Florina Hull, DO   Endocrinologist    Please note that portions of this note were completed with a voice recognition program.

## 2023-02-17 ENCOUNTER — ANESTHESIA EVENT (OUTPATIENT)
Dept: LABOR AND DELIVERY | Facility: HOSPITAL | Age: 38
End: 2023-02-17
Payer: COMMERCIAL

## 2023-02-17 ENCOUNTER — HOSPITAL ENCOUNTER (INPATIENT)
Facility: HOSPITAL | Age: 38
LOS: 4 days | Discharge: HOME | End: 2023-02-21
Attending: OBSTETRICS & GYNECOLOGY | Admitting: OBSTETRICS & GYNECOLOGY
Payer: COMMERCIAL

## 2023-02-17 ENCOUNTER — ANESTHESIA (OUTPATIENT)
Dept: LABOR AND DELIVERY | Facility: HOSPITAL | Age: 38
End: 2023-02-17
Payer: COMMERCIAL

## 2023-02-17 PROBLEM — Z98.891 S/P CESAREAN SECTION: Status: ACTIVE | Noted: 2023-02-17

## 2023-02-17 LAB
ABO GROUP BLD: NORMAL
ALP SERPL-CCNC: 198 U/L (ref 39–117)
ALT SERPL W P-5'-P-CCNC: 22 U/L (ref 1–33)
AST SERPL-CCNC: 22 U/L (ref 1–32)
ATMOSPHERIC PRESS: ABNORMAL MM[HG]
ATMOSPHERIC PRESS: ABNORMAL MM[HG]
BASE EXCESS BLDCOA CALC-SCNC: -3.7 MMOL/L (ref 0–2)
BASE EXCESS BLDCOV CALC-SCNC: -3.6 MMOL/L (ref 0–2)
BDY SITE: ABNORMAL
BDY SITE: ABNORMAL
BILIRUB SERPL-MCNC: 0.3 MG/DL (ref 0–1.2)
BLD GP AB SCN SERPL QL: NEGATIVE
BODY TEMPERATURE: 37 C
BODY TEMPERATURE: 37 C
CO2 BLDA-SCNC: 27.7 MMOL/L (ref 22–33)
CO2 BLDA-SCNC: 27.9 MMOL/L (ref 22–33)
COLLECT TME SMN: ABNORMAL
CREAT SERPL-MCNC: 0.62 MG/DL (ref 0.57–1)
DEPRECATED RDW RBC AUTO: 43.8 FL (ref 37–54)
EPAP: 0
EPAP: 0
ERYTHROCYTE [DISTWIDTH] IN BLOOD BY AUTOMATED COUNT: 13.7 % (ref 12.3–15.4)
HCO3 BLDCOA-SCNC: 26 MMOL/L (ref 16.9–20.5)
HCO3 BLDCOV-SCNC: 25.8 MMOL/L (ref 18.6–21.4)
HCT VFR BLD AUTO: 40.4 % (ref 34–46.6)
HGB BLD-MCNC: 14 G/DL (ref 12–15.9)
HGB BLDA-MCNC: 20.2 G/DL (ref 14–18)
HGB BLDA-MCNC: 20.2 G/DL (ref 14–18)
INHALED O2 CONCENTRATION: 21 %
INHALED O2 CONCENTRATION: 21 %
IPAP: 0
IPAP: 0
LDH SERPL-CCNC: 202 U/L (ref 135–214)
Lab: ABNORMAL
MCH RBC QN AUTO: 30.5 PG (ref 26.6–33)
MCHC RBC AUTO-ENTMCNC: 34.7 G/DL (ref 31.5–35.7)
MCV RBC AUTO: 88 FL (ref 79–97)
MODALITY: ABNORMAL
MODALITY: ABNORMAL
NOTE: ABNORMAL
NOTE: ABNORMAL
NOTIFIED BY: ABNORMAL
NOTIFIED WHO: ABNORMAL
PAW @ PEAK INSP FLOW SETTING VENT: 0 CMH2O
PAW @ PEAK INSP FLOW SETTING VENT: 0 CMH2O
PCO2 BLDCOA: 62.8 MMHG (ref 43.3–54.9)
PCO2 BLDCOV: 61.1 MM HG (ref 28–40)
PH BLDCOA: 7.23 PH UNITS (ref 7.22–7.3)
PH BLDCOV: 7.23 PH UNITS (ref 7.31–7.37)
PLATELET # BLD AUTO: 400 10*3/MM3 (ref 140–450)
PMV BLD AUTO: 9.5 FL (ref 6–12)
PO2 BLDCOA: 14.1 MMHG (ref 11.5–43.3)
PO2 BLDCOV: 12.4 MM HG (ref 21–31)
RBC # BLD AUTO: 4.59 10*6/MM3 (ref 3.77–5.28)
RH BLD: POSITIVE
SAO2 % BLDCOA: ABNORMAL %
T&S EXPIRATION DATE: NORMAL
TOTAL RATE: 0 BREATHS/MINUTE
TOTAL RATE: 0 BREATHS/MINUTE
URATE SERPL-MCNC: 4.8 MG/DL (ref 2.4–5.7)
WBC NRBC COR # BLD: 14.78 10*3/MM3 (ref 3.4–10.8)

## 2023-02-17 PROCEDURE — 88307 TISSUE EXAM BY PATHOLOGIST: CPT | Performed by: OBSTETRICS & GYNECOLOGY

## 2023-02-17 PROCEDURE — 25010000002 MORPHINE PER 10 MG: Performed by: ANESTHESIOLOGY

## 2023-02-17 PROCEDURE — 25010000002 KETOROLAC TROMETHAMINE PER 15 MG: Performed by: OBSTETRICS & GYNECOLOGY

## 2023-02-17 PROCEDURE — 84460 ALANINE AMINO (ALT) (SGPT): CPT | Performed by: OBSTETRICS & GYNECOLOGY

## 2023-02-17 PROCEDURE — 84450 TRANSFERASE (AST) (SGOT): CPT | Performed by: OBSTETRICS & GYNECOLOGY

## 2023-02-17 PROCEDURE — 25010000002 CHLOROPROCAINE HCL (PF) 3 % SOLUTION: Performed by: ANESTHESIOLOGY

## 2023-02-17 PROCEDURE — 82805 BLOOD GASES W/O2 SATURATION: CPT

## 2023-02-17 PROCEDURE — 86900 BLOOD TYPING SEROLOGIC ABO: CPT | Performed by: OBSTETRICS & GYNECOLOGY

## 2023-02-17 PROCEDURE — 25010000002 AZITHROMYCIN PER 500 MG: Performed by: OBSTETRICS & GYNECOLOGY

## 2023-02-17 PROCEDURE — 82565 ASSAY OF CREATININE: CPT | Performed by: OBSTETRICS & GYNECOLOGY

## 2023-02-17 PROCEDURE — 25010000002 METHYLERGONOVINE MALEATE PER 0.2 MG: Performed by: OBSTETRICS & GYNECOLOGY

## 2023-02-17 PROCEDURE — 51703 INSERT BLADDER CATH COMPLEX: CPT

## 2023-02-17 PROCEDURE — 86850 RBC ANTIBODY SCREEN: CPT | Performed by: OBSTETRICS & GYNECOLOGY

## 2023-02-17 PROCEDURE — 36415 COLL VENOUS BLD VENIPUNCTURE: CPT | Performed by: OBSTETRICS & GYNECOLOGY

## 2023-02-17 PROCEDURE — C1755 CATHETER, INTRASPINAL: HCPCS | Performed by: ANESTHESIOLOGY

## 2023-02-17 PROCEDURE — 25010000002 ONDANSETRON PER 1 MG: Performed by: OBSTETRICS & GYNECOLOGY

## 2023-02-17 PROCEDURE — 25010000002 ONDANSETRON PER 1 MG: Performed by: ANESTHESIOLOGY

## 2023-02-17 PROCEDURE — 82247 BILIRUBIN TOTAL: CPT | Performed by: OBSTETRICS & GYNECOLOGY

## 2023-02-17 PROCEDURE — 83615 LACTATE (LD) (LDH) ENZYME: CPT | Performed by: OBSTETRICS & GYNECOLOGY

## 2023-02-17 PROCEDURE — 25010000002 MIDAZOLAM PER 1 MG: Performed by: ANESTHESIOLOGY

## 2023-02-17 PROCEDURE — 25010000002 PENICILLIN G POTASSIUM PER 600000 UNITS: Performed by: OBSTETRICS & GYNECOLOGY

## 2023-02-17 PROCEDURE — 84550 ASSAY OF BLOOD/URIC ACID: CPT | Performed by: OBSTETRICS & GYNECOLOGY

## 2023-02-17 PROCEDURE — 84075 ASSAY ALKALINE PHOSPHATASE: CPT | Performed by: OBSTETRICS & GYNECOLOGY

## 2023-02-17 PROCEDURE — 25010000002 ROPIVACAINE PER 1 MG: Performed by: ANESTHESIOLOGY

## 2023-02-17 PROCEDURE — 86901 BLOOD TYPING SEROLOGIC RH(D): CPT | Performed by: OBSTETRICS & GYNECOLOGY

## 2023-02-17 PROCEDURE — C1755 CATHETER, INTRASPINAL: HCPCS

## 2023-02-17 PROCEDURE — 25010000002 FENTANYL CITRATE (PF) 50 MCG/ML SOLUTION: Performed by: ANESTHESIOLOGY

## 2023-02-17 PROCEDURE — 85027 COMPLETE CBC AUTOMATED: CPT | Performed by: OBSTETRICS & GYNECOLOGY

## 2023-02-17 RX ORDER — SODIUM CHLORIDE 0.9 % (FLUSH) 0.9 %
10 SYRINGE (ML) INJECTION AS NEEDED
Status: DISCONTINUED | OUTPATIENT
Start: 2023-02-17 | End: 2023-02-17 | Stop reason: HOSPADM

## 2023-02-17 RX ORDER — ONDANSETRON 4 MG/1
4 TABLET, FILM COATED ORAL EVERY 6 HOURS PRN
Status: DISCONTINUED | OUTPATIENT
Start: 2023-02-17 | End: 2023-02-21 | Stop reason: HOSPADM

## 2023-02-17 RX ORDER — DIPHENHYDRAMINE HYDROCHLORIDE 50 MG/ML
12.5 INJECTION INTRAMUSCULAR; INTRAVENOUS EVERY 8 HOURS PRN
Status: DISCONTINUED | OUTPATIENT
Start: 2023-02-17 | End: 2023-02-17 | Stop reason: HOSPADM

## 2023-02-17 RX ORDER — OXYTOCIN/0.9 % SODIUM CHLORIDE 30/500 ML
PLASTIC BAG, INJECTION (ML) INTRAVENOUS AS NEEDED
Status: DISCONTINUED | OUTPATIENT
Start: 2023-02-17 | End: 2023-02-17 | Stop reason: SURG

## 2023-02-17 RX ORDER — SODIUM CHLORIDE, SODIUM LACTATE, POTASSIUM CHLORIDE, CALCIUM CHLORIDE 600; 310; 30; 20 MG/100ML; MG/100ML; MG/100ML; MG/100ML
125 INJECTION, SOLUTION INTRAVENOUS CONTINUOUS
Status: DISCONTINUED | OUTPATIENT
Start: 2023-02-17 | End: 2023-02-17

## 2023-02-17 RX ORDER — ONDANSETRON 2 MG/ML
4 INJECTION INTRAMUSCULAR; INTRAVENOUS EVERY 6 HOURS PRN
Status: DISCONTINUED | OUTPATIENT
Start: 2023-02-17 | End: 2023-02-21 | Stop reason: HOSPADM

## 2023-02-17 RX ORDER — POLYETHYLENE GLYCOL 3350 17 G/17G
17 POWDER, FOR SOLUTION ORAL DAILY
Status: DISCONTINUED | OUTPATIENT
Start: 2023-02-17 | End: 2023-02-21 | Stop reason: HOSPADM

## 2023-02-17 RX ORDER — KETOROLAC TROMETHAMINE 30 MG/ML
30 INJECTION, SOLUTION INTRAMUSCULAR; INTRAVENOUS ONCE
Status: COMPLETED | OUTPATIENT
Start: 2023-02-17 | End: 2023-02-17

## 2023-02-17 RX ORDER — METOCLOPRAMIDE HYDROCHLORIDE 5 MG/ML
10 INJECTION INTRAMUSCULAR; INTRAVENOUS EVERY 6 HOURS PRN
Status: DISCONTINUED | OUTPATIENT
Start: 2023-02-17 | End: 2023-02-21 | Stop reason: HOSPADM

## 2023-02-17 RX ORDER — FENTANYL CITRATE 50 UG/ML
INJECTION, SOLUTION INTRAMUSCULAR; INTRAVENOUS AS NEEDED
Status: DISCONTINUED | OUTPATIENT
Start: 2023-02-17 | End: 2023-02-17 | Stop reason: SURG

## 2023-02-17 RX ORDER — PRENATAL VIT/IRON FUM/FOLIC AC 27MG-0.8MG
1 TABLET ORAL DAILY
Status: DISCONTINUED | OUTPATIENT
Start: 2023-02-17 | End: 2023-02-21 | Stop reason: HOSPADM

## 2023-02-17 RX ORDER — OXYTOCIN/0.9 % SODIUM CHLORIDE 30/500 ML
999 PLASTIC BAG, INJECTION (ML) INTRAVENOUS ONCE
Status: CANCELLED | OUTPATIENT
Start: 2023-02-17 | End: 2023-02-17

## 2023-02-17 RX ORDER — HYDROCORTISONE 25 MG/G
1 CREAM TOPICAL AS NEEDED
Status: DISCONTINUED | OUTPATIENT
Start: 2023-02-17 | End: 2023-02-21 | Stop reason: HOSPADM

## 2023-02-17 RX ORDER — OXYTOCIN/0.9 % SODIUM CHLORIDE 30/500 ML
250 PLASTIC BAG, INJECTION (ML) INTRAVENOUS CONTINUOUS
Status: CANCELLED | OUTPATIENT
Start: 2023-02-17 | End: 2023-02-17

## 2023-02-17 RX ORDER — LIDOCAINE HYDROCHLORIDE AND EPINEPHRINE 15; 5 MG/ML; UG/ML
INJECTION, SOLUTION EPIDURAL AS NEEDED
Status: DISCONTINUED | OUTPATIENT
Start: 2023-02-17 | End: 2023-02-17 | Stop reason: SURG

## 2023-02-17 RX ORDER — ROPIVACAINE HYDROCHLORIDE 5 MG/ML
INJECTION, SOLUTION EPIDURAL; INFILTRATION; PERINEURAL AS NEEDED
Status: DISCONTINUED | OUTPATIENT
Start: 2023-02-17 | End: 2023-02-17 | Stop reason: SURG

## 2023-02-17 RX ORDER — DOCUSATE SODIUM 100 MG/1
100 CAPSULE, LIQUID FILLED ORAL 2 TIMES DAILY PRN
Status: DISCONTINUED | OUTPATIENT
Start: 2023-02-17 | End: 2023-02-21 | Stop reason: HOSPADM

## 2023-02-17 RX ORDER — OXYCODONE HYDROCHLORIDE 5 MG/1
10 TABLET ORAL EVERY 4 HOURS PRN
Status: DISCONTINUED | OUTPATIENT
Start: 2023-02-17 | End: 2023-02-21 | Stop reason: HOSPADM

## 2023-02-17 RX ORDER — MISOPROSTOL 200 UG/1
800 TABLET ORAL AS NEEDED
Status: CANCELLED | OUTPATIENT
Start: 2023-02-17

## 2023-02-17 RX ORDER — NALOXONE HCL 0.4 MG/ML
0.4 VIAL (ML) INJECTION
Status: ACTIVE | OUTPATIENT
Start: 2023-02-17 | End: 2023-02-18

## 2023-02-17 RX ORDER — CARBOPROST TROMETHAMINE 250 UG/ML
250 INJECTION, SOLUTION INTRAMUSCULAR AS NEEDED
Status: CANCELLED | OUTPATIENT
Start: 2023-02-17

## 2023-02-17 RX ORDER — PENICILLIN G 3000000 [IU]/50ML
3 INJECTION, SOLUTION INTRAVENOUS EVERY 4 HOURS
Status: DISCONTINUED | OUTPATIENT
Start: 2023-02-17 | End: 2023-02-17 | Stop reason: HOSPADM

## 2023-02-17 RX ORDER — ONDANSETRON 2 MG/ML
INJECTION INTRAMUSCULAR; INTRAVENOUS AS NEEDED
Status: DISCONTINUED | OUTPATIENT
Start: 2023-02-17 | End: 2023-02-17 | Stop reason: SURG

## 2023-02-17 RX ORDER — CARBOPROST TROMETHAMINE 250 UG/ML
250 INJECTION, SOLUTION INTRAMUSCULAR AS NEEDED
Status: DISCONTINUED | OUTPATIENT
Start: 2023-02-17 | End: 2023-02-21 | Stop reason: HOSPADM

## 2023-02-17 RX ORDER — ONDANSETRON 4 MG/1
4 TABLET, FILM COATED ORAL EVERY 6 HOURS PRN
Status: DISCONTINUED | OUTPATIENT
Start: 2023-02-17 | End: 2023-02-17 | Stop reason: HOSPADM

## 2023-02-17 RX ORDER — METHYLERGONOVINE MALEATE 0.2 MG/ML
200 INJECTION INTRAVENOUS ONCE AS NEEDED
Status: CANCELLED | OUTPATIENT
Start: 2023-02-17

## 2023-02-17 RX ORDER — DIPHENHYDRAMINE HYDROCHLORIDE 50 MG/ML
25 INJECTION INTRAMUSCULAR; INTRAVENOUS EVERY 4 HOURS PRN
Status: CANCELLED | OUTPATIENT
Start: 2023-02-17

## 2023-02-17 RX ORDER — LIDOCAINE HYDROCHLORIDE AND EPINEPHRINE 20; 5 MG/ML; UG/ML
INJECTION, SOLUTION EPIDURAL; INFILTRATION; INTRACAUDAL; PERINEURAL AS NEEDED
Status: DISCONTINUED | OUTPATIENT
Start: 2023-02-17 | End: 2023-02-17 | Stop reason: SURG

## 2023-02-17 RX ORDER — ONDANSETRON 2 MG/ML
4 INJECTION INTRAMUSCULAR; INTRAVENOUS ONCE AS NEEDED
Status: DISPENSED | OUTPATIENT
Start: 2023-02-17 | End: 2023-02-18

## 2023-02-17 RX ORDER — ALUMINA, MAGNESIA, AND SIMETHICONE 2400; 2400; 240 MG/30ML; MG/30ML; MG/30ML
15 SUSPENSION ORAL EVERY 4 HOURS PRN
Status: DISCONTINUED | OUTPATIENT
Start: 2023-02-17 | End: 2023-02-21 | Stop reason: HOSPADM

## 2023-02-17 RX ORDER — MAGNESIUM CARB/ALUMINUM HYDROX 105-160MG
30 TABLET,CHEWABLE ORAL ONCE
Status: DISCONTINUED | OUTPATIENT
Start: 2023-02-17 | End: 2023-02-17 | Stop reason: HOSPADM

## 2023-02-17 RX ORDER — METOCLOPRAMIDE HYDROCHLORIDE 5 MG/ML
10 INJECTION INTRAMUSCULAR; INTRAVENOUS ONCE AS NEEDED
Status: DISCONTINUED | OUTPATIENT
Start: 2023-02-17 | End: 2023-02-17 | Stop reason: HOSPADM

## 2023-02-17 RX ORDER — METHYLERGONOVINE MALEATE 0.2 MG/ML
200 INJECTION INTRAVENOUS AS NEEDED
Status: DISCONTINUED | OUTPATIENT
Start: 2023-02-17 | End: 2023-02-21 | Stop reason: HOSPADM

## 2023-02-17 RX ORDER — EPHEDRINE SULFATE 50 MG/ML
INJECTION, SOLUTION INTRAVENOUS AS NEEDED
Status: DISCONTINUED | OUTPATIENT
Start: 2023-02-17 | End: 2023-02-17 | Stop reason: SURG

## 2023-02-17 RX ORDER — CEFAZOLIN SODIUM 2 G/100ML
2 INJECTION, SOLUTION INTRAVENOUS ONCE
Status: COMPLETED | OUTPATIENT
Start: 2023-02-17 | End: 2023-02-17

## 2023-02-17 RX ORDER — DIPHENHYDRAMINE HCL 25 MG
25 CAPSULE ORAL EVERY 4 HOURS PRN
Status: CANCELLED | OUTPATIENT
Start: 2023-02-17

## 2023-02-17 RX ORDER — MISOPROSTOL 200 UG/1
600 TABLET ORAL AS NEEDED
Status: DISCONTINUED | OUTPATIENT
Start: 2023-02-17 | End: 2023-02-21 | Stop reason: HOSPADM

## 2023-02-17 RX ORDER — LIDOCAINE HYDROCHLORIDE 10 MG/ML
5 INJECTION, SOLUTION EPIDURAL; INFILTRATION; INTRACAUDAL; PERINEURAL AS NEEDED
Status: DISCONTINUED | OUTPATIENT
Start: 2023-02-17 | End: 2023-02-17 | Stop reason: HOSPADM

## 2023-02-17 RX ORDER — CHLOROPROCAINE HYDROCHLORIDE 30 MG/ML
INJECTION, SOLUTION EPIDURAL; INFILTRATION; INTRACAUDAL; PERINEURAL AS NEEDED
Status: DISCONTINUED | OUTPATIENT
Start: 2023-02-17 | End: 2023-02-17 | Stop reason: SURG

## 2023-02-17 RX ORDER — ACETAMINOPHEN 500 MG
1000 TABLET ORAL ONCE
Status: COMPLETED | OUTPATIENT
Start: 2023-02-17 | End: 2023-02-17

## 2023-02-17 RX ORDER — LEVOTHYROXINE SODIUM 0.1 MG/1
100 TABLET ORAL
Status: DISCONTINUED | OUTPATIENT
Start: 2023-02-17 | End: 2023-02-17 | Stop reason: HOSPADM

## 2023-02-17 RX ORDER — CALCIUM CARBONATE 200(500)MG
1 TABLET,CHEWABLE ORAL EVERY 4 HOURS PRN
Status: DISCONTINUED | OUTPATIENT
Start: 2023-02-17 | End: 2023-02-21 | Stop reason: HOSPADM

## 2023-02-17 RX ORDER — KETOROLAC TROMETHAMINE 15 MG/ML
15 INJECTION, SOLUTION INTRAMUSCULAR; INTRAVENOUS EVERY 6 HOURS
Status: COMPLETED | OUTPATIENT
Start: 2023-02-17 | End: 2023-02-18

## 2023-02-17 RX ORDER — DIPHENHYDRAMINE HCL 25 MG
25 CAPSULE ORAL EVERY 4 HOURS PRN
Status: DISCONTINUED | OUTPATIENT
Start: 2023-02-17 | End: 2023-02-21 | Stop reason: HOSPADM

## 2023-02-17 RX ORDER — TRISODIUM CITRATE DIHYDRATE AND CITRIC ACID MONOHYDRATE 500; 334 MG/5ML; MG/5ML
30 SOLUTION ORAL ONCE
Status: COMPLETED | OUTPATIENT
Start: 2023-02-17 | End: 2023-02-17

## 2023-02-17 RX ORDER — ACETAMINOPHEN 500 MG
1000 TABLET ORAL EVERY 6 HOURS
Status: COMPLETED | OUTPATIENT
Start: 2023-02-17 | End: 2023-02-18

## 2023-02-17 RX ORDER — SODIUM CHLORIDE 0.9 % (FLUSH) 0.9 %
3 SYRINGE (ML) INJECTION EVERY 12 HOURS SCHEDULED
Status: DISCONTINUED | OUTPATIENT
Start: 2023-02-17 | End: 2023-02-17 | Stop reason: HOSPADM

## 2023-02-17 RX ORDER — MORPHINE SULFATE 1 MG/ML
INJECTION, SOLUTION EPIDURAL; INTRATHECAL; INTRAVENOUS AS NEEDED
Status: DISCONTINUED | OUTPATIENT
Start: 2023-02-17 | End: 2023-02-17 | Stop reason: SURG

## 2023-02-17 RX ORDER — ACETAMINOPHEN 325 MG/1
650 TABLET ORAL EVERY 6 HOURS
Status: DISCONTINUED | OUTPATIENT
Start: 2023-02-18 | End: 2023-02-21 | Stop reason: HOSPADM

## 2023-02-17 RX ORDER — IBUPROFEN 600 MG/1
600 TABLET ORAL EVERY 6 HOURS
Status: DISCONTINUED | OUTPATIENT
Start: 2023-02-18 | End: 2023-02-21 | Stop reason: HOSPADM

## 2023-02-17 RX ORDER — FAMOTIDINE 10 MG/ML
INJECTION, SOLUTION INTRAVENOUS AS NEEDED
Status: DISCONTINUED | OUTPATIENT
Start: 2023-02-17 | End: 2023-02-17 | Stop reason: SURG

## 2023-02-17 RX ORDER — MIDAZOLAM HYDROCHLORIDE 1 MG/ML
INJECTION INTRAMUSCULAR; INTRAVENOUS AS NEEDED
Status: DISCONTINUED | OUTPATIENT
Start: 2023-02-17 | End: 2023-02-17 | Stop reason: SURG

## 2023-02-17 RX ORDER — ROPIVACAINE HYDROCHLORIDE 2 MG/ML
15 INJECTION, SOLUTION EPIDURAL; INFILTRATION; PERINEURAL CONTINUOUS
Status: DISCONTINUED | OUTPATIENT
Start: 2023-02-17 | End: 2023-02-17

## 2023-02-17 RX ORDER — FAMOTIDINE 10 MG/ML
20 INJECTION, SOLUTION INTRAVENOUS ONCE AS NEEDED
Status: DISCONTINUED | OUTPATIENT
Start: 2023-02-17 | End: 2023-02-17 | Stop reason: HOSPADM

## 2023-02-17 RX ORDER — ONDANSETRON 2 MG/ML
4 INJECTION INTRAMUSCULAR; INTRAVENOUS ONCE AS NEEDED
Status: DISCONTINUED | OUTPATIENT
Start: 2023-02-17 | End: 2023-02-17 | Stop reason: HOSPADM

## 2023-02-17 RX ORDER — BUTORPHANOL TARTRATE 1 MG/ML
1 INJECTION, SOLUTION INTRAMUSCULAR; INTRAVENOUS
Status: DISCONTINUED | OUTPATIENT
Start: 2023-02-17 | End: 2023-02-17 | Stop reason: HOSPADM

## 2023-02-17 RX ORDER — OXYCODONE HYDROCHLORIDE 5 MG/1
5 TABLET ORAL EVERY 4 HOURS PRN
Status: DISCONTINUED | OUTPATIENT
Start: 2023-02-17 | End: 2023-02-21 | Stop reason: HOSPADM

## 2023-02-17 RX ORDER — ONDANSETRON 2 MG/ML
4 INJECTION INTRAMUSCULAR; INTRAVENOUS EVERY 6 HOURS PRN
Status: DISCONTINUED | OUTPATIENT
Start: 2023-02-17 | End: 2023-02-17 | Stop reason: HOSPADM

## 2023-02-17 RX ORDER — DIPHENHYDRAMINE HYDROCHLORIDE 50 MG/ML
25 INJECTION INTRAMUSCULAR; INTRAVENOUS ONCE AS NEEDED
Status: CANCELLED | OUTPATIENT
Start: 2023-02-17

## 2023-02-17 RX ORDER — EPHEDRINE SULFATE 5 MG/ML
10 INJECTION INTRAVENOUS
Status: DISCONTINUED | OUTPATIENT
Start: 2023-02-17 | End: 2023-02-17 | Stop reason: HOSPADM

## 2023-02-17 RX ADMIN — ONDANSETRON 4 MG: 2 INJECTION INTRAMUSCULAR; INTRAVENOUS at 04:46

## 2023-02-17 RX ADMIN — LIDOCAINE HYDROCHLORIDE,EPINEPHRINE BITARTRATE 10 ML: 20; .005 INJECTION, SOLUTION EPIDURAL; INFILTRATION; INTRACAUDAL; PERINEURAL at 05:47

## 2023-02-17 RX ADMIN — EPHEDRINE SULFATE 10 MG: 5 INJECTION INTRAVENOUS at 05:40

## 2023-02-17 RX ADMIN — LEVOTHYROXINE SODIUM 100 MCG: 0.1 TABLET ORAL at 07:57

## 2023-02-17 RX ADMIN — MIDAZOLAM 1.5 MG: 1 INJECTION INTRAMUSCULAR; INTRAVENOUS at 06:26

## 2023-02-17 RX ADMIN — DOCUSATE SODIUM 100 MG: 100 CAPSULE, LIQUID FILLED ORAL at 22:20

## 2023-02-17 RX ADMIN — SODIUM CHLORIDE, POTASSIUM CHLORIDE, SODIUM LACTATE AND CALCIUM CHLORIDE: 600; 310; 30; 20 INJECTION, SOLUTION INTRAVENOUS at 06:04

## 2023-02-17 RX ADMIN — ROPIVACAINE HYDROCHLORIDE 15 ML/HR: 2 INJECTION, SOLUTION EPIDURAL; INFILTRATION at 04:12

## 2023-02-17 RX ADMIN — KETOROLAC TROMETHAMINE 30 MG: 30 INJECTION, SOLUTION INTRAMUSCULAR; INTRAVENOUS at 07:30

## 2023-02-17 RX ADMIN — SODIUM CHLORIDE 5 MILLION UNITS: 900 INJECTION INTRAVENOUS at 04:05

## 2023-02-17 RX ADMIN — LIDOCAINE HYDROCHLORIDE AND EPINEPHRINE 2 ML: 15; 5 INJECTION, SOLUTION EPIDURAL at 04:06

## 2023-02-17 RX ADMIN — LIDOCAINE HYDROCHLORIDE AND EPINEPHRINE 3 ML: 15; 5 INJECTION, SOLUTION EPIDURAL at 04:04

## 2023-02-17 RX ADMIN — ACETAMINOPHEN 1000 MG: 500 TABLET ORAL at 17:22

## 2023-02-17 RX ADMIN — MORPHINE SULFATE 3 MG: 1 INJECTION, SOLUTION EPIDURAL; INTRATHECAL; INTRAVENOUS at 06:20

## 2023-02-17 RX ADMIN — MIDAZOLAM 1.5 MG: 1 INJECTION INTRAMUSCULAR; INTRAVENOUS at 06:19

## 2023-02-17 RX ADMIN — ONDANSETRON 4 MG: 2 INJECTION INTRAMUSCULAR; INTRAVENOUS at 11:20

## 2023-02-17 RX ADMIN — ROPIVACAINE HYDROCHLORIDE 6 ML: 5 INJECTION, SOLUTION EPIDURAL; INFILTRATION; PERINEURAL at 04:08

## 2023-02-17 RX ADMIN — AZITHROMYCIN 500 MG: 500 INJECTION, POWDER, LYOPHILIZED, FOR SOLUTION INTRAVENOUS at 07:44

## 2023-02-17 RX ADMIN — ACETAMINOPHEN 1000 MG: 500 TABLET ORAL at 05:58

## 2023-02-17 RX ADMIN — Medication 500 ML: at 06:32

## 2023-02-17 RX ADMIN — FAMOTIDINE 20 MG: 10 INJECTION, SOLUTION INTRAVENOUS at 06:06

## 2023-02-17 RX ADMIN — Medication 2 G: at 05:53

## 2023-02-17 RX ADMIN — SODIUM CITRATE AND CITRIC ACID MONOHYDRATE 30 ML: 500; 334 SOLUTION ORAL at 05:53

## 2023-02-17 RX ADMIN — Medication 500 ML: at 06:19

## 2023-02-17 RX ADMIN — ONDANSETRON 4 MG: 2 INJECTION INTRAMUSCULAR; INTRAVENOUS at 06:06

## 2023-02-17 RX ADMIN — KETOROLAC TROMETHAMINE 15 MG: 15 INJECTION, SOLUTION INTRAMUSCULAR; INTRAVENOUS at 20:53

## 2023-02-17 RX ADMIN — KETOROLAC TROMETHAMINE 15 MG: 15 INJECTION, SOLUTION INTRAMUSCULAR; INTRAVENOUS at 14:31

## 2023-02-17 RX ADMIN — ACETAMINOPHEN 1000 MG: 500 TABLET ORAL at 12:03

## 2023-02-17 RX ADMIN — METHYLERGONOVINE MALEATE 200 MCG: 0.2 INJECTION, SOLUTION INTRAMUSCULAR; INTRAVENOUS at 07:49

## 2023-02-17 RX ADMIN — CHLOROPROCAINE HYDROCHLORIDE 5 ML: 30 INJECTION, SOLUTION EPIDURAL; INFILTRATION; INTRACAUDAL; PERINEURAL at 06:07

## 2023-02-17 RX ADMIN — SODIUM CHLORIDE, POTASSIUM CHLORIDE, SODIUM LACTATE AND CALCIUM CHLORIDE: 600; 310; 30; 20 INJECTION, SOLUTION INTRAVENOUS at 06:32

## 2023-02-17 RX ADMIN — EPHEDRINE SULFATE 10 MG: 50 INJECTION, SOLUTION INTRAVENOUS at 06:13

## 2023-02-17 RX ADMIN — OXYCODONE HYDROCHLORIDE 5 MG: 5 TABLET ORAL at 08:20

## 2023-02-17 RX ADMIN — EPHEDRINE SULFATE 10 MG: 5 INJECTION INTRAVENOUS at 04:36

## 2023-02-17 RX ADMIN — FENTANYL CITRATE 100 MCG: 50 INJECTION, SOLUTION INTRAMUSCULAR; INTRAVENOUS at 04:08

## 2023-02-17 NOTE — ANESTHESIA POSTPROCEDURE EVALUATION
Patient: Denia Gage    Procedure Summary     Date: 23 Room / Location: [facility] LABOR DELIVERY   [facility] LABOR DELIVERY    Anesthesia Start: 356 Anesthesia Stop:     Procedure:  SECTION PRIMARY (Abdomen) Diagnosis:     Surgeons: Sugar Darling MD Provider: Karla Davis DO    Anesthesia Type: epidural ASA Status: 2          Anesthesia Type: epidural    Vitals  Vitals Value Taken Time   BP 96/53 23 0644   Temp 98.4 °F (36.9 °C) 23 0419   Pulse 95 23 0647   Resp 16 23 0422   SpO2 92 % 23 0647   Vitals shown include unvalidated device data.  RR 15  T 97.7      Post Anesthesia Care and Evaluation    Patient location during evaluation: bedside  Patient participation: complete - patient participated  Level of consciousness: awake and awake and alert  Pain score: 0  Pain management: satisfactory to patient    Airway patency: patent  Anesthetic complications: No anesthetic complications  PONV Status: none  Cardiovascular status: acceptable, hemodynamically stable and stable  Respiratory status: acceptable  Hydration status: stable  Post Neuraxial Block status: No signs or symptoms of PDPH

## 2023-02-17 NOTE — ANESTHESIA PROCEDURE NOTES
Labor Epidural      Patient reassessed immediately prior to procedure    Patient location during procedure: OB  Performed By  Anesthesiologist: Karla Davis DO  Preanesthetic Checklist  Completed: patient identified, IV checked, risks and benefits discussed, surgical consent, monitors and equipment checked, pre-op evaluation and timeout performed  Additional Notes  CSE performed using 25g [place]  Prep:  Pt Position:sitting  Sterile Tech:cap, gloves, mask and sterile barrier  Prep:chlorhexidine gluconate and isopropyl alcohol  Monitoring:blood pressure monitoring  Epidural Block Procedure:  Approach:midline  Guidance:palpation technique  Location:L3-L4  Needle Type:Tuohy  Needle Gauge:17 G  Loss of Resistance Medium: air  Loss of Resistance: 5cm  Cath Depth at skin:10 cm  Paresthesia: none  Aspiration:negative  Test Dose:negative  Number of Attempts: 1  Post Assessment:  Dressing:occlusive dressing applied and secured with tape  Pt Tolerance:patient tolerated the procedure well with no apparent complications  Complications:no

## 2023-02-17 NOTE — ANESTHESIA PREPROCEDURE EVALUATION
Anesthesia Evaluation     Patient summary reviewed and Nursing notes reviewed                Airway   Mallampati: II  TM distance: >3 FB  Neck ROM: full  No difficulty expected  Dental      Pulmonary - negative pulmonary ROS   Cardiovascular - negative cardio ROS        Neuro/Psych- negative ROS  GI/Hepatic/Renal/Endo    (+)   thyroid problem (h/o Graves, thyroidectomy) hypothyroidism    Musculoskeletal (-) negative ROS    Abdominal    Substance History - negative use     OB/GYN    (+) Pregnant,         Other - negative ROS                       Anesthesia Plan    ASA 2     epidural       Anesthetic plan, risks, benefits, and alternatives have been provided, discussed and informed consent has been obtained with: patient.        CODE STATUS:    Level Of Support Discussed With: Patient  Code Status (Patient has no pulse and is not breathing): CPR (Attempt to Resuscitate)  Medical Interventions (Patient has pulse or is breathing): Full Support  Release to patient: Routine Release

## 2023-02-18 LAB
BASOPHILS # BLD AUTO: 0.04 10*3/MM3 (ref 0–0.2)
BASOPHILS NFR BLD AUTO: 0.3 % (ref 0–1.5)
DEPRECATED RDW RBC AUTO: 47.9 FL (ref 37–54)
EOSINOPHIL # BLD AUTO: 0.07 10*3/MM3 (ref 0–0.4)
EOSINOPHIL NFR BLD AUTO: 0.5 % (ref 0.3–6.2)
ERYTHROCYTE [DISTWIDTH] IN BLOOD BY AUTOMATED COUNT: 14 % (ref 12.3–15.4)
HCT VFR BLD AUTO: 30.7 % (ref 34–46.6)
HGB BLD-MCNC: 9.9 G/DL (ref 12–15.9)
IMM GRANULOCYTES # BLD AUTO: 0.09 10*3/MM3 (ref 0–0.05)
IMM GRANULOCYTES NFR BLD AUTO: 0.6 % (ref 0–0.5)
LYMPHOCYTES # BLD AUTO: 2.17 10*3/MM3 (ref 0.7–3.1)
LYMPHOCYTES NFR BLD AUTO: 14.1 % (ref 19.6–45.3)
MCH RBC QN AUTO: 30.2 PG (ref 26.6–33)
MCHC RBC AUTO-ENTMCNC: 32.2 G/DL (ref 31.5–35.7)
MCV RBC AUTO: 93.6 FL (ref 79–97)
MONOCYTES # BLD AUTO: 1.38 10*3/MM3 (ref 0.1–0.9)
MONOCYTES NFR BLD AUTO: 9 % (ref 5–12)
NEUTROPHILS NFR BLD AUTO: 11.63 10*3/MM3 (ref 1.7–7)
NEUTROPHILS NFR BLD AUTO: 75.5 % (ref 42.7–76)
NRBC BLD AUTO-RTO: 0 /100 WBC (ref 0–0.2)
PLATELET # BLD AUTO: 318 10*3/MM3 (ref 140–450)
PMV BLD AUTO: 9.7 FL (ref 6–12)
RBC # BLD AUTO: 3.28 10*6/MM3 (ref 3.77–5.28)
WBC NRBC COR # BLD: 15.38 10*3/MM3 (ref 3.4–10.8)

## 2023-02-18 PROCEDURE — 63710000001 DIPHENHYDRAMINE PER 50 MG: Performed by: OBSTETRICS & GYNECOLOGY

## 2023-02-18 PROCEDURE — 85025 COMPLETE CBC W/AUTO DIFF WBC: CPT | Performed by: OBSTETRICS & GYNECOLOGY

## 2023-02-18 PROCEDURE — 25010000002 KETOROLAC TROMETHAMINE PER 15 MG: Performed by: OBSTETRICS & GYNECOLOGY

## 2023-02-18 RX ORDER — LEVOTHYROXINE SODIUM 0.1 MG/1
100 TABLET ORAL
Status: DISCONTINUED | OUTPATIENT
Start: 2023-02-19 | End: 2023-02-21 | Stop reason: HOSPADM

## 2023-02-18 RX ADMIN — IBUPROFEN 600 MG: 600 TABLET ORAL at 22:36

## 2023-02-18 RX ADMIN — KETOROLAC TROMETHAMINE 15 MG: 15 INJECTION, SOLUTION INTRAMUSCULAR; INTRAVENOUS at 08:36

## 2023-02-18 RX ADMIN — IBUPROFEN 600 MG: 600 TABLET ORAL at 16:28

## 2023-02-18 RX ADMIN — ACETAMINOPHEN 325MG 650 MG: 325 TABLET ORAL at 18:25

## 2023-02-18 RX ADMIN — DIPHENHYDRAMINE HYDROCHLORIDE 25 MG: 25 CAPSULE ORAL at 00:33

## 2023-02-18 RX ADMIN — ACETAMINOPHEN 1000 MG: 500 TABLET ORAL at 00:22

## 2023-02-18 RX ADMIN — ACETAMINOPHEN 1000 MG: 500 TABLET ORAL at 06:30

## 2023-02-18 RX ADMIN — ACETAMINOPHEN 325MG 650 MG: 325 TABLET ORAL at 23:20

## 2023-02-18 RX ADMIN — PRENATAL VITAMINS-IRON FUMARATE 27 MG IRON-FOLIC ACID 0.8 MG TABLET 1 TABLET: at 08:36

## 2023-02-18 RX ADMIN — DOCUSATE SODIUM 100 MG: 100 CAPSULE, LIQUID FILLED ORAL at 08:36

## 2023-02-18 RX ADMIN — POLYETHYLENE GLYCOL 3350 17 G: 17 POWDER, FOR SOLUTION ORAL at 08:36

## 2023-02-18 RX ADMIN — ACETAMINOPHEN 325MG 650 MG: 325 TABLET ORAL at 11:24

## 2023-02-18 RX ADMIN — KETOROLAC TROMETHAMINE 15 MG: 15 INJECTION, SOLUTION INTRAMUSCULAR; INTRAVENOUS at 03:25

## 2023-02-19 RX ADMIN — POLYETHYLENE GLYCOL 3350 17 G: 17 POWDER, FOR SOLUTION ORAL at 08:28

## 2023-02-19 RX ADMIN — DOCUSATE SODIUM 100 MG: 100 CAPSULE, LIQUID FILLED ORAL at 22:32

## 2023-02-19 RX ADMIN — IBUPROFEN 600 MG: 600 TABLET ORAL at 03:51

## 2023-02-19 RX ADMIN — ACETAMINOPHEN 325MG 650 MG: 325 TABLET ORAL at 17:23

## 2023-02-19 RX ADMIN — PRENATAL VITAMINS-IRON FUMARATE 27 MG IRON-FOLIC ACID 0.8 MG TABLET 1 TABLET: at 08:27

## 2023-02-19 RX ADMIN — IBUPROFEN 600 MG: 600 TABLET ORAL at 08:27

## 2023-02-19 RX ADMIN — DOCUSATE SODIUM 100 MG: 100 CAPSULE, LIQUID FILLED ORAL at 08:28

## 2023-02-19 RX ADMIN — LEVOTHYROXINE SODIUM 100 MCG: 0.1 TABLET ORAL at 06:19

## 2023-02-19 RX ADMIN — ACETAMINOPHEN 325MG 650 MG: 325 TABLET ORAL at 06:19

## 2023-02-19 RX ADMIN — IBUPROFEN 600 MG: 600 TABLET ORAL at 22:32

## 2023-02-19 RX ADMIN — ACETAMINOPHEN 325MG 650 MG: 325 TABLET ORAL at 23:57

## 2023-02-19 RX ADMIN — IBUPROFEN 600 MG: 600 TABLET ORAL at 14:25

## 2023-02-19 RX ADMIN — ACETAMINOPHEN 325MG 650 MG: 325 TABLET ORAL at 11:40

## 2023-02-20 RX ORDER — BISACODYL 10 MG
10 SUPPOSITORY, RECTAL RECTAL DAILY
Status: DISCONTINUED | OUTPATIENT
Start: 2023-02-20 | End: 2023-02-20

## 2023-02-20 RX ORDER — FERROUS SULFATE 325(65) MG
325 TABLET ORAL 2 TIMES DAILY WITH MEALS
Status: DISCONTINUED | OUTPATIENT
Start: 2023-02-20 | End: 2023-02-21 | Stop reason: HOSPADM

## 2023-02-20 RX ORDER — BISACODYL 10 MG
10 SUPPOSITORY, RECTAL RECTAL ONCE
Status: COMPLETED | OUTPATIENT
Start: 2023-02-20 | End: 2023-02-20

## 2023-02-20 RX ADMIN — IBUPROFEN 600 MG: 600 TABLET ORAL at 20:31

## 2023-02-20 RX ADMIN — DOCUSATE SODIUM 100 MG: 100 CAPSULE, LIQUID FILLED ORAL at 08:28

## 2023-02-20 RX ADMIN — ACETAMINOPHEN 325MG 650 MG: 325 TABLET ORAL at 06:21

## 2023-02-20 RX ADMIN — IBUPROFEN 600 MG: 600 TABLET ORAL at 04:22

## 2023-02-20 RX ADMIN — ACETAMINOPHEN 325MG 650 MG: 325 TABLET ORAL at 12:45

## 2023-02-20 RX ADMIN — ACETAMINOPHEN 325MG 650 MG: 325 TABLET ORAL at 18:07

## 2023-02-20 RX ADMIN — ALUMINUM HYDROXIDE, MAGNESIUM HYDROXIDE, AND DIMETHICONE 15 ML: 400; 400; 40 SUSPENSION ORAL at 10:54

## 2023-02-20 RX ADMIN — Medication 10 MG: at 17:23

## 2023-02-20 RX ADMIN — PRENATAL VITAMINS-IRON FUMARATE 27 MG IRON-FOLIC ACID 0.8 MG TABLET 1 TABLET: at 08:27

## 2023-02-20 RX ADMIN — IBUPROFEN 600 MG: 600 TABLET ORAL at 15:59

## 2023-02-20 RX ADMIN — POLYETHYLENE GLYCOL 3350 17 G: 17 POWDER, FOR SOLUTION ORAL at 08:28

## 2023-02-20 RX ADMIN — ACETAMINOPHEN 325MG 650 MG: 325 TABLET ORAL at 23:09

## 2023-02-20 RX ADMIN — LEVOTHYROXINE SODIUM 100 MCG: 0.1 TABLET ORAL at 06:21

## 2023-02-20 RX ADMIN — IBUPROFEN 600 MG: 600 TABLET ORAL at 10:48

## 2023-02-21 VITALS
WEIGHT: 150 LBS | DIASTOLIC BLOOD PRESSURE: 78 MMHG | HEART RATE: 104 BPM | OXYGEN SATURATION: 98 % | TEMPERATURE: 97.5 F | RESPIRATION RATE: 18 BRPM | BODY MASS INDEX: 23.54 KG/M2 | SYSTOLIC BLOOD PRESSURE: 121 MMHG | HEIGHT: 67 IN

## 2023-02-21 RX ORDER — ACETAMINOPHEN 325 MG/1
650 TABLET ORAL EVERY 6 HOURS
Start: 2023-02-21

## 2023-02-21 RX ORDER — IBUPROFEN 600 MG/1
600 TABLET ORAL EVERY 6 HOURS
Start: 2023-02-21

## 2023-02-21 RX ORDER — FERROUS SULFATE 325(65) MG
325 TABLET ORAL 2 TIMES DAILY WITH MEALS
Qty: 90 TABLET | Refills: 0 | Status: SHIPPED | OUTPATIENT
Start: 2023-02-21

## 2023-02-21 RX ORDER — CALCIUM CARBONATE 200(500)MG
1 TABLET,CHEWABLE ORAL EVERY 4 HOURS PRN
Start: 2023-02-21

## 2023-02-21 RX ORDER — DOCUSATE SODIUM 100 MG/1
100 CAPSULE, LIQUID FILLED ORAL 2 TIMES DAILY PRN
Qty: 30 CAPSULE | Refills: 1 | Status: SHIPPED | OUTPATIENT
Start: 2023-02-21

## 2023-02-21 RX ADMIN — IBUPROFEN 600 MG: 600 TABLET ORAL at 03:19

## 2023-02-21 RX ADMIN — PRENATAL VITAMINS-IRON FUMARATE 27 MG IRON-FOLIC ACID 0.8 MG TABLET 1 TABLET: at 08:33

## 2023-02-21 RX ADMIN — POLYETHYLENE GLYCOL 3350 17 G: 17 POWDER, FOR SOLUTION ORAL at 08:33

## 2023-02-21 RX ADMIN — IBUPROFEN 600 MG: 600 TABLET ORAL at 08:33

## 2023-02-21 RX ADMIN — DOCUSATE SODIUM 100 MG: 100 CAPSULE, LIQUID FILLED ORAL at 08:33

## 2023-02-21 RX ADMIN — LEVOTHYROXINE SODIUM 100 MCG: 0.1 TABLET ORAL at 06:50

## 2023-02-21 RX ADMIN — ACETAMINOPHEN 325MG 650 MG: 325 TABLET ORAL at 06:50

## 2023-02-23 LAB
CYTO UR: NORMAL
LAB AP CASE REPORT: NORMAL
LAB AP CLINICAL INFORMATION: NORMAL
PATH REPORT.FINAL DX SPEC: NORMAL
PATH REPORT.GROSS SPEC: NORMAL

## 2023-03-14 ENCOUNTER — HOSPITAL ENCOUNTER (OUTPATIENT)
Dept: LACTATION | Facility: HOSPITAL | Age: 38
Discharge: HOME | End: 2023-03-14

## 2023-03-14 NOTE — LACTATION NOTE
03/14/23 1445   Maternal Information   Date of Referral 03/14/23   Person Making Referral patient   Maternal Reason for Referral latch difficulty   Infant Reason for Referral other (see comments)  (Baby was 36 weeks 2 days gestation at birth and was in the NICU. He has not been successfully breastfeeding.)   Maternal Assessment   Breast Size Issue none   Breast Shape Bilateral:;round   Breast Density Bilateral:;soft   Nipples Bilateral:;everted;short   Left Nipple Symptoms intact;nontender   Right Nipple Symptoms intact;nontender   Maternal Infant Feeding   Maternal Emotional State relaxed;receptive   Infant Positioning clutch/football;cross-cradle   Signs of Milk Transfer other (see comments)  (Baby was very frantic at the breast and would not latch until fed about 1.5 ounces from a bottle. He also needed formula tube fed at the breast while breastfeeding.)   Pain with Feeding no   Comfort Measures Before/During Feeding infant position adjusted;maternal position adjusted;other (see comments)  (Infant fed formula prior to latching.  A nipple shield was needed.)   Latch Assistance full assistance needed   Support Person Involvement actively supporting mother   Feeding Infant   Feeding Readiness Cues crying;eager;other (see comments)  (Baby very frantic and not patient for breastfeeding.)   Satiety Cues other (see comments)  (Baby finally calmed down after being fed about 3-4 ounces of formula.)   Effective Latch During Feeding no   Prefeeding Weight (gm)   (No pre-post breastfeeding weight check possible because baby would not latch without also being fed formula.)   Milk Expression/Equipment   Breast Pump Type double electric, personal   Breast Pumping   Breast Pumping Interventions frequent pumping encouraged;other (see comments)  (Mom provided pumping schedule to help increase milk supply.)     Reason for Visit:  Baby is a NICU graduate, born at 36 weeks 2 days gestation.  Mom has been pumping and primarily  bottle feeding baby pumped milk plus high calorie formula. She would like to breast feed or a combination of breast and bottle feeding.    Assessment:  Baby is frantic and will not latch.  He seems very hungry and wants to take a bottle very fast.  The only way he finally latched was after getting about 1.5 ounces of formula and being tube fed while breastfeeding.  He never really got comfortable at the breast.  It was observed that baby curls his upper lip under significantly when sucking.  Mom was shown how to pull the nipple out anytime he's sucking on anything.    Plan of Care:  There are two main goals to accomplish before trying much harder to get baby to latch.  Mom needs to get her pumped milk volume up since she is currently pumping only 50-60 mL's each pumping session and baby needs closer to 100 mL's.  She was given a plan to increase her milk supply including pumping 20 minutes each session and power pumping 3 times per day. She will do this for 3 days or more if it works.  Mom was given instruction on paced bottle feeding and was encouraged to try to get baby to eat slower.  Once she gets her milk volume up and baby can take a bottle at a paced rate, putting the baby to the breast can be considered.  Mom will call for a follow-up appointment, if needed.

## 2023-03-17 DIAGNOSIS — E89.0 POSTOPERATIVE HYPOTHYROIDISM: ICD-10-CM

## 2023-03-17 RX ORDER — LEVOTHYROXINE SODIUM 0.1 MG/1
100 TABLET ORAL DAILY
Qty: 90 TABLET | Refills: 1 | Status: SHIPPED | OUTPATIENT
Start: 2023-03-17 | End: 2023-03-20 | Stop reason: SDUPTHER

## 2023-03-17 NOTE — TELEPHONE ENCOUNTER
Last ov 02/15/2023  Next ov 06/21/2023     Colchicine Pregnancy And Lactation Text: This medication is Pregnancy Category C and isn't considered safe during pregnancy. It is excreted in breast milk.

## 2023-03-20 DIAGNOSIS — E89.0 POSTOPERATIVE HYPOTHYROIDISM: ICD-10-CM

## 2023-03-27 ENCOUNTER — LAB (OUTPATIENT)
Dept: LAB | Facility: HOSPITAL | Age: 38
End: 2023-03-27
Payer: COMMERCIAL

## 2023-03-27 DIAGNOSIS — E89.0 POSTOPERATIVE HYPOTHYROIDISM: ICD-10-CM

## 2023-03-27 LAB
T4 FREE SERPL-MCNC: 1.6 NG/DL (ref 0.93–1.7)
TSH SERPL DL<=0.05 MIU/L-ACNC: 0.14 UIU/ML (ref 0.27–4.2)

## 2023-03-27 PROCEDURE — 36415 COLL VENOUS BLD VENIPUNCTURE: CPT

## 2023-03-27 PROCEDURE — 84439 ASSAY OF FREE THYROXINE: CPT

## 2023-03-27 PROCEDURE — 84443 ASSAY THYROID STIM HORMONE: CPT

## 2023-03-28 DIAGNOSIS — E89.0 POSTOPERATIVE HYPOTHYROIDISM: Primary | ICD-10-CM

## 2023-03-28 RX ORDER — LEVOTHYROXINE SODIUM 88 UG/1
88 TABLET ORAL DAILY
Qty: 30 TABLET | Refills: 5 | Status: SHIPPED | OUTPATIENT
Start: 2023-03-28

## 2023-03-28 RX ORDER — LEVOTHYROXINE SODIUM 0.1 MG/1
100 TABLET ORAL DAILY
Qty: 90 TABLET | Refills: 1 | Status: SHIPPED | OUTPATIENT
Start: 2023-03-28 | End: 2023-03-28

## 2023-04-19 ENCOUNTER — HOSPITAL ENCOUNTER (EMERGENCY)
Facility: HOSPITAL | Age: 38
Discharge: HOME | End: 2023-04-19
Attending: EMERGENCY MEDICINE | Admitting: EMERGENCY MEDICINE
Payer: COMMERCIAL

## 2023-04-19 ENCOUNTER — APPOINTMENT (OUTPATIENT)
Dept: CT IMAGING | Facility: HOSPITAL | Age: 38
End: 2023-04-19
Payer: COMMERCIAL

## 2023-04-19 VITALS
OXYGEN SATURATION: 97 % | WEIGHT: 130 LBS | TEMPERATURE: 97.7 F | DIASTOLIC BLOOD PRESSURE: 74 MMHG | SYSTOLIC BLOOD PRESSURE: 110 MMHG | BODY MASS INDEX: 20.4 KG/M2 | HEIGHT: 67 IN | HEART RATE: 73 BPM | RESPIRATION RATE: 16 BRPM

## 2023-04-19 DIAGNOSIS — R10.10 ACUTE UPPER ABDOMINAL PAIN: Primary | ICD-10-CM

## 2023-04-19 DIAGNOSIS — R79.89 ABNORMAL LFTS: ICD-10-CM

## 2023-04-19 DIAGNOSIS — Z98.891 STATUS POST CESAREAN SECTION: ICD-10-CM

## 2023-04-19 LAB
ALBUMIN SERPL-MCNC: 4.6 G/DL (ref 3.5–5.2)
ALBUMIN/GLOB SERPL: 1.9 G/DL
ALP SERPL-CCNC: 75 U/L (ref 39–117)
ALT SERPL W P-5'-P-CCNC: 42 U/L (ref 1–33)
ANION GAP SERPL CALCULATED.3IONS-SCNC: 13 MMOL/L (ref 5–15)
AST SERPL-CCNC: 55 U/L (ref 1–32)
BASOPHILS # BLD AUTO: 0.05 10*3/MM3 (ref 0–0.2)
BASOPHILS NFR BLD AUTO: 0.4 % (ref 0–1.5)
BILIRUB SERPL-MCNC: 0.3 MG/DL (ref 0–1.2)
BILIRUB UR QL STRIP: NEGATIVE
BUN SERPL-MCNC: 18 MG/DL (ref 6–20)
BUN/CREAT SERPL: 25 (ref 7–25)
CALCIUM SPEC-SCNC: 9.6 MG/DL (ref 8.6–10.5)
CHLORIDE SERPL-SCNC: 102 MMOL/L (ref 98–107)
CLARITY UR: CLEAR
CO2 SERPL-SCNC: 28 MMOL/L (ref 22–29)
COLOR UR: YELLOW
CREAT SERPL-MCNC: 0.72 MG/DL (ref 0.57–1)
DEPRECATED RDW RBC AUTO: 40.3 FL (ref 37–54)
EGFRCR SERPLBLD CKD-EPI 2021: 112 ML/MIN/1.73
EOSINOPHIL # BLD AUTO: 0.08 10*3/MM3 (ref 0–0.4)
EOSINOPHIL NFR BLD AUTO: 0.6 % (ref 0.3–6.2)
ERYTHROCYTE [DISTWIDTH] IN BLOOD BY AUTOMATED COUNT: 12.3 % (ref 12.3–15.4)
GLOBULIN UR ELPH-MCNC: 2.4 GM/DL
GLUCOSE SERPL-MCNC: 100 MG/DL (ref 65–99)
GLUCOSE UR STRIP-MCNC: NEGATIVE MG/DL
HCT VFR BLD AUTO: 39 % (ref 34–46.6)
HGB BLD-MCNC: 12.4 G/DL (ref 12–15.9)
HGB UR QL STRIP.AUTO: NEGATIVE
HOLD SPECIMEN: NORMAL
HOLD SPECIMEN: NORMAL
IMM GRANULOCYTES # BLD AUTO: 0.05 10*3/MM3 (ref 0–0.05)
IMM GRANULOCYTES NFR BLD AUTO: 0.4 % (ref 0–0.5)
KETONES UR QL STRIP: NEGATIVE
LEUKOCYTE ESTERASE UR QL STRIP.AUTO: NEGATIVE
LIPASE SERPL-CCNC: 85 U/L (ref 13–60)
LYMPHOCYTES # BLD AUTO: 2.16 10*3/MM3 (ref 0.7–3.1)
LYMPHOCYTES NFR BLD AUTO: 17 % (ref 19.6–45.3)
MCH RBC QN AUTO: 28.3 PG (ref 26.6–33)
MCHC RBC AUTO-ENTMCNC: 31.8 G/DL (ref 31.5–35.7)
MCV RBC AUTO: 89 FL (ref 79–97)
MONOCYTES # BLD AUTO: 0.94 10*3/MM3 (ref 0.1–0.9)
MONOCYTES NFR BLD AUTO: 7.4 % (ref 5–12)
NEUTROPHILS NFR BLD AUTO: 74.2 % (ref 42.7–76)
NEUTROPHILS NFR BLD AUTO: 9.44 10*3/MM3 (ref 1.7–7)
NITRITE UR QL STRIP: NEGATIVE
NRBC BLD AUTO-RTO: 0 /100 WBC (ref 0–0.2)
PH UR STRIP.AUTO: 5.5 [PH] (ref 5–8)
PLATELET # BLD AUTO: 329 10*3/MM3 (ref 140–450)
PMV BLD AUTO: 8.8 FL (ref 6–12)
POTASSIUM SERPL-SCNC: 4 MMOL/L (ref 3.5–5.2)
PROT SERPL-MCNC: 7 G/DL (ref 6–8.5)
PROT UR QL STRIP: NEGATIVE
RBC # BLD AUTO: 4.38 10*6/MM3 (ref 3.77–5.28)
SODIUM SERPL-SCNC: 143 MMOL/L (ref 136–145)
SP GR UR STRIP: 1.02 (ref 1–1.03)
UROBILINOGEN UR QL STRIP: NORMAL
WBC NRBC COR # BLD: 12.72 10*3/MM3 (ref 3.4–10.8)
WHOLE BLOOD HOLD COAG: NORMAL
WHOLE BLOOD HOLD SPECIMEN: NORMAL

## 2023-04-19 PROCEDURE — 74177 CT ABD & PELVIS W/CONTRAST: CPT

## 2023-04-19 PROCEDURE — 81003 URINALYSIS AUTO W/O SCOPE: CPT | Performed by: EMERGENCY MEDICINE

## 2023-04-19 PROCEDURE — 85025 COMPLETE CBC W/AUTO DIFF WBC: CPT | Performed by: EMERGENCY MEDICINE

## 2023-04-19 PROCEDURE — 80053 COMPREHEN METABOLIC PANEL: CPT | Performed by: EMERGENCY MEDICINE

## 2023-04-19 PROCEDURE — 99283 EMERGENCY DEPT VISIT LOW MDM: CPT

## 2023-04-19 PROCEDURE — 83690 ASSAY OF LIPASE: CPT | Performed by: EMERGENCY MEDICINE

## 2023-04-19 PROCEDURE — 25510000001 IOPAMIDOL 61 % SOLUTION: Performed by: EMERGENCY MEDICINE

## 2023-04-19 PROCEDURE — 93005 ELECTROCARDIOGRAM TRACING: CPT | Performed by: EMERGENCY MEDICINE

## 2023-04-19 PROCEDURE — 93005 ELECTROCARDIOGRAM TRACING: CPT

## 2023-04-19 RX ORDER — SODIUM CHLORIDE 0.9 % (FLUSH) 0.9 %
10 SYRINGE (ML) INJECTION AS NEEDED
Status: DISCONTINUED | OUTPATIENT
Start: 2023-04-19 | End: 2023-04-19 | Stop reason: HOSPADM

## 2023-04-19 RX ORDER — MAGNESIUM HYDROXIDE/ALUMINUM HYDROXICE/SIMETHICONE 120; 1200; 1200 MG/30ML; MG/30ML; MG/30ML
10 SUSPENSION ORAL ONCE
Status: DISCONTINUED | OUTPATIENT
Start: 2023-04-19 | End: 2023-04-19 | Stop reason: HOSPADM

## 2023-04-19 RX ORDER — FAMOTIDINE 10 MG/ML
20 INJECTION, SOLUTION INTRAVENOUS ONCE
Status: DISCONTINUED | OUTPATIENT
Start: 2023-04-19 | End: 2023-04-19 | Stop reason: HOSPADM

## 2023-04-19 RX ADMIN — IOPAMIDOL 90 ML: 612 INJECTION, SOLUTION INTRAVENOUS at 01:56

## 2023-04-19 NOTE — ED PROVIDER NOTES
[facility]    EMERGENCY DEPARTMENT ENCOUNTER      Pt Name: Denia Gage  MRN: [id number]  YOB: 1985  Date of evaluation: 2023  Provider: Chavez Hanson MD    CHIEF COMPLAINT       Chief Complaint   Patient presents with   • Abdominal Pain         HISTORY OF PRESENT ILLNESS   Denia Gage is a 37 y.o. female who presents to the emergency department with sudden onset moderate severity aching/burning epigastric abdominal pain that began this evening and worsened prior to getting to the emergency department.  She states that after she got here it began to resolve and she is now asymptomatic.  She denies any associated chest pain, shortness of breath, fever, chills, nausea, vomiting, or diarrhea.  She recently had .  She notes no obvious inciting or modifying factors.      Nursing notes were reviewed.    REVIEW OF SYSTEMS     ROS:  A chief complaint appropriate review of systems was completed and is negative except as noted in the HPI.      PAST MEDICAL HISTORY     Past Medical History:   Diagnosis Date   • Graves disease 2020    Was on methimazole and atenolol, followed with [facility]. - s/p thyroidectomy - followed with ophthalmologist at [facility].   • History of headache 2020    Resolved after thyroidectomy 2021   • Hypothyroidism     postoperative, thyroidectomy 21 for Graves disease         SURGICAL HISTORY       Past Surgical History:   Procedure Laterality Date   •  SECTION N/A 2023    Procedure:  SECTION PRIMARY;  Surgeon: Sugar Darling MD;  Location: [facility] LABOR DELIVERY;  Service: Obstetrics/Gynecology;  Laterality: N/A;   • GANGLION CYST EXCISION Left 2021    Dorsal wrist   • HYMENECTOMY     • THYROIDECTOMY  2021    For Graves disease, difficult to manage         CURRENT MEDICATIONS     No current facility-administered medications for this encounter.    Current Outpatient Medications:   •  acetaminophen  (TYLENOL) 325 MG tablet, Take 2 tablets by mouth Every 6 (Six) Hours., Disp: , Rfl:   •  calcium carbonate (TUMS) 500 MG chewable tablet, Chew 1 tablet Every 4 (Four) Hours As Needed for Indigestion or Heartburn., Disp: , Rfl:   •  cholecalciferol (VITAMIN D3) 25 MCG (1000 UT) tablet, Take 1,000 Units by mouth Daily., Disp: , Rfl:   •  docusate sodium (COLACE) 100 MG capsule, Take 1 capsule by mouth 2 (Two) Times a Day As Needed for Constipation., Disp: 30 capsule, Rfl: 1  •  ferrous sulfate 325 (65 FE) MG tablet, Take 1 tablet by mouth 2 (Two) Times a Day With Meals., Disp: 90 tablet, Rfl: 0  •  ibuprofen (ADVIL,MOTRIN) 600 MG tablet, Take 1 tablet by mouth Every 6 (Six) Hours., Disp: , Rfl:   •  lanolin topical, Apply 1 application topically to the appropriate area as directed Every 1 (One) Hour As Needed for Dry Skin (nipple pain)., Disp: , Rfl:   •  levothyroxine (SYNTHROID, LEVOTHROID) 88 MCG tablet, Take 1 tablet by mouth Daily., Disp: 30 tablet, Rfl: 5  •  Prenatal Vit-Fe Fumarate-FA (PRENATAL VITAMIN PO), Take  by mouth Daily., Disp: , Rfl:     ALLERGIES     Patient has no known allergies.    FAMILY HISTORY       Family History   Problem Relation Age of Onset   • Arthritis Mother    • Thyroid disease Mother    • Retinal detachment Father    • Mental illness Maternal Uncle    • Mental illness Paternal Aunt    • Cancer Paternal Aunt    • Obesity Paternal Aunt    • Arthritis Maternal Grandmother           SOCIAL HISTORY       Social History     Socioeconomic History   • Marital status:    Tobacco Use   • Smoking status: Never   • Smokeless tobacco: Never   Vaping Use   • Vaping Use: Never used   Substance and Sexual Activity   • Alcohol use: Not Currently     Comment: social   • Drug use: Never   • Sexual activity: Defer         PHYSICAL EXAM    (up to 7 for level 4, 8 or more for level 5)     Vitals:    04/19/23 0013   BP: 110/74   BP Location: Left arm   Patient Position: Sitting   Pulse: 73   Resp: 16  "  Temp: 97.7 °F (36.5 °C)   TempSrc: Oral   SpO2: 97%   Weight: 59 kg (130 lb)   Height: 170.2 cm (67\")       General: Awake, alert, no acute distress.  HEENT: Conjunctivae normal.  Neck: Trachea midline.  Cardiac: Heart regular rate, rhythm, no murmurs, rubs, or gallops  Lungs: Lungs are clear to auscultation, there is no wheezing, rhonchi, or rales. There is no use of accessory muscles.  Chest wall: There is no tenderness to palpation over the chest wall or over ribs  Abdomen: Abdomen is soft, nontender, nondistended. There are no firm or pulsatile masses, no rebound rigidity or guarding.   Musculoskeletal: No deformity.  Neuro: Alert and oriented x 4.  Dermatology: Skin is warm and dry  Psych: Mentation is grossly normal, cognition is grossly normal. Affect is appropriate.        DIAGNOSTIC RESULTS     EKG: All EKGs are interpreted by the Emergency Department Physician who either signs or Co-signs this chart in the absence of a cardiologist.    ECG 12 Lead ED Triage Standing Order; Abdominal Pain (Upper)   Preliminary Result   Test Reason : ED Triage Standing Order~   Blood Pressure :   */*   mmHG   Vent. Rate :  72 BPM     Atrial Rate :  72 BPM      P-R Int : 158 ms          QRS Dur :  88 ms       QT Int : 400 ms       P-R-T Axes :  60  47  63 degrees      QTc Int : 438 ms      Normal sinus rhythm   Possible Left atrial enlargement   Borderline ECG   No previous ECGs available      Referred By: EDMD           Confirmed By:             RADIOLOGY:   [x] Radiologist's Report Reviewed:  CT Abdomen Pelvis With Contrast   Final Result   1.  Postoperative changes, status post recent  procedure with mild infiltration of subcutaneous tissues at the lower anterior abdominal wall. No fluid collection or abscess is seen.    2.  No bowel obstruction, bowel thickening, appendicitis, cholecystitis, obstructive uropathy, pancreatitis, free fluid, fluid collection, focal intraperitoneal inflammatory change, soft tissue " mass, lymphadenopathy is demonstrated.    3.  Heterogeneity of the uterus with prominence of the endometrium are in keeping with the recent postpartum state.      Thank you for the referral of this patient. This exam was interpreted by an American Board of Radiology certified radiologist with subspecialty fellowship training. If there are any questions regarding this exam please feel free to contact a radiologist    directly at [phone].   1.         Electronically signed by:  El Peguero M.D.     4/19/2023 1:16 AM Mountain Time          I ordered and independently reviewed the above noted radiographic studies.        LABS:    I have reviewed and interpreted all of the currently available lab results from this visit (if applicable):  Results for orders placed or performed during the hospital encounter of 04/19/23   Comprehensive Metabolic Panel    Specimen: Blood   Result Value Ref Range    Glucose 100 (H) 65 - 99 mg/dL    BUN 18 6 - 20 mg/dL    Creatinine 0.72 0.57 - 1.00 mg/dL    Sodium 143 136 - 145 mmol/L    Potassium 4.0 3.5 - 5.2 mmol/L    Chloride 102 98 - 107 mmol/L    CO2 28.0 22.0 - 29.0 mmol/L    Calcium 9.6 8.6 - 10.5 mg/dL    Total Protein 7.0 6.0 - 8.5 g/dL    Albumin 4.6 3.5 - 5.2 g/dL    ALT (SGPT) 42 (H) 1 - 33 U/L    AST (SGOT) 55 (H) 1 - 32 U/L    Alkaline Phosphatase 75 39 - 117 U/L    Total Bilirubin 0.3 0.0 - 1.2 mg/dL    Globulin 2.4 gm/dL    A/G Ratio 1.9 g/dL    BUN/Creatinine Ratio 25.0 7.0 - 25.0    Anion Gap 13.0 5.0 - 15.0 mmol/L    eGFR 112.0 >60.0 mL/min/1.73   Lipase    Specimen: Blood   Result Value Ref Range    Lipase 85 (H) 13 - 60 U/L   Urinalysis With Microscopic If Indicated (No Culture) - Urine, Clean Catch    Specimen: Urine, Clean Catch   Result Value Ref Range    Color, UA Yellow Yellow, Straw    Appearance, UA Clear Clear    pH, UA 5.5 5.0 - 8.0    Specific Gravity, UA 1.019 1.001 - 1.030    Glucose, UA Negative Negative    Ketones, UA Negative Negative     Bilirubin, UA Negative Negative    Blood, UA Negative Negative    Protein, UA Negative Negative    Leuk Esterase, UA Negative Negative    Nitrite, UA Negative Negative    Urobilinogen, UA 0.2 E.U./dL 0.2 - 1.0 E.U./dL   CBC Auto Differential    Specimen: Blood   Result Value Ref Range    WBC 12.72 (H) 3.40 - 10.80 10*3/mm3    RBC 4.38 3.77 - 5.28 10*6/mm3    Hemoglobin 12.4 12.0 - 15.9 g/dL    Hematocrit 39.0 34.0 - 46.6 %    MCV 89.0 79.0 - 97.0 fL    MCH 28.3 26.6 - 33.0 pg    MCHC 31.8 31.5 - 35.7 g/dL    RDW 12.3 12.3 - 15.4 %    RDW-SD 40.3 37.0 - 54.0 fl    MPV 8.8 6.0 - 12.0 fL    Platelets 329 140 - 450 10*3/mm3    Neutrophil % 74.2 42.7 - 76.0 %    Lymphocyte % 17.0 (L) 19.6 - 45.3 %    Monocyte % 7.4 5.0 - 12.0 %    Eosinophil % 0.6 0.3 - 6.2 %    Basophil % 0.4 0.0 - 1.5 %    Immature Grans % 0.4 0.0 - 0.5 %    Neutrophils, Absolute 9.44 (H) 1.70 - 7.00 10*3/mm3    Lymphocytes, Absolute 2.16 0.70 - 3.10 10*3/mm3    Monocytes, Absolute 0.94 (H) 0.10 - 0.90 10*3/mm3    Eosinophils, Absolute 0.08 0.00 - 0.40 10*3/mm3    Basophils, Absolute 0.05 0.00 - 0.20 10*3/mm3    Immature Grans, Absolute 0.05 0.00 - 0.05 10*3/mm3    nRBC 0.0 0.0 - 0.2 /100 WBC   ECG 12 Lead ED Triage Standing Order; Abdominal Pain (Upper)   Result Value Ref Range    QT Interval 400 ms    QTC Interval 438 ms   Lavender Top   Result Value Ref Range    Extra Tube hold for add-on    Gold Top - SST   Result Value Ref Range    Extra Tube Hold for add-ons.    Gray Top   Result Value Ref Range    Extra Tube Hold for add-ons.    Light Blue Top   Result Value Ref Range    Extra Tube Hold for add-ons.         If labs were ordered, I independently reviewed the results and considered them in treating the patient.      EMERGENCY DEPARTMENT COURSE and DIFFERENTIAL DIAGNOSIS/MDM:   Vitals:  AS OF 07:13 EDT    BP - 110/74  HR - 73  TEMP - 97.7 °F (36.5 °C) (Oral)  O2 SATS - 97%        Discussion below represents my analysis of pertinent findings related  to patient's condition, differential diagnosis, treatment plan and final disposition.      Differential diagnosis:  The differential diagnosis associated with the patient's presentation includes: Gastritis, biliary colic, cholecystitis, pancreatitis, acute hepatitis, obstruction, perforation, appendicitis      Independent interpretations (ECG/rhythm strip/X-ray/US/CT scan): I independently interpreted the patient's abdominal CT and see no evidence of obstruction.      Additional sources:  Discussed/obtained information from independent historians:   [] Spouse:   [] Parent:   [] Friend:   [] EMS:   [] Other:  External (non-ED) record review:   [x] Inpatient record: I reviewed documentation from recent admission for .  This was done without complication.   [] Office record:   [] Outpatient record:   [] Prior Outpatient labs:   [] Prior Outpatient radiology:   [] Primary Care record:   [] Outside ED record:   [] Other:       Patient's care impacted by:   [] Diabetes   [] Hypertension   [] Coronary Artery Disease   [] Cancer   [x] Other: Postpartum.    Care significantly affected by Social Determinants of Health (housing and economic circumstances, unemployment)    [] Yes     [x] No   If yes, Patient's care significantly limited by  Social Determinants of Health including:    [] Inadequate housing    [] Low income    [] Alcoholism and drug addiction in family    [] Problems related to primary support group    [] Unemployment    [] Problems related to employment    [] Other Social Determinants of Health:       I considered prescription management with:    [x] Pain medication: I considered management of the patient's pain with IV morphine, however her pain had resolved by the time I evaluated her   [] Antiviral:   [] Antibiotic:   [] Other:      ED Course:           This patient presents with upper abdominal pain that resolved prior to my evaluation.  No evidence of emergent pathology identified.  Suspect likely  gastric in origin, possibly gastritis/dyspepsia.  No evidence of surgical pathology identified on CT scan.    I had a discussion with the patient/family regarding diagnosis, diagnostic results, treatment plan, and medications.  The patient/family indicated understanding of these instructions.  I spent adequate time at the bedside preceding discharge necessary to personally discuss the aftercare instructions, giving patient education, providing explanations of the results of our evaluations/findings, and my decision making to assure that the patient/family understand the plan of care.  Time was allotted to answer questions at that time and throughout the ED course.  Emphasis was placed on timely follow-up after discharge.  I also discussed the potential for the development of an acute emergent condition requiring further evaluation, admission, or even surgical intervention. I discussed that we found nothing during the visit today indicating the need for further workup, admission, or the presence of an unstable medical condition.  I encouraged the patient to return to the emergency department immediately for ANY concerns, worsening, new complaints, or if symptoms persist and unable to seek follow-up in a timely fashion.  The patient/family expressed understanding and agreement with this plan.  The patient will follow-up with their PCP in 1-2 days for reevaluation.           PROCEDURES:  Procedures    CRITICAL CARE TIME        FINAL IMPRESSION      1. Acute upper abdominal pain    2. Status post  section    3. Abnormal LFTs          DISPOSITION/PLAN     ED Disposition     ED Disposition   Discharge    Condition   Stable    Comment   --               Comment: Please note this report has been produced using speech recognition software.      Chavez Hanson MD  Attending Emergency Physician           Chavez Hanson MD  23 [id number]

## 2023-04-22 LAB
QT INTERVAL: 400 MS
QTC INTERVAL: 438 MS

## 2023-05-09 ENCOUNTER — OFFICE VISIT (OUTPATIENT)
Dept: INTERNAL MEDICINE | Facility: CLINIC | Age: 38
End: 2023-05-09
Payer: COMMERCIAL

## 2023-05-09 VITALS
BODY MASS INDEX: 20.97 KG/M2 | TEMPERATURE: 97.5 F | WEIGHT: 133.6 LBS | HEART RATE: 64 BPM | DIASTOLIC BLOOD PRESSURE: 70 MMHG | HEIGHT: 67 IN | SYSTOLIC BLOOD PRESSURE: 110 MMHG

## 2023-05-09 DIAGNOSIS — R74.8 ELEVATED LIPASE: ICD-10-CM

## 2023-05-09 DIAGNOSIS — R74.8 ELEVATED LIVER ENZYMES: Primary | ICD-10-CM

## 2023-05-09 DIAGNOSIS — E05.00 GRAVES DISEASE: Chronic | ICD-10-CM

## 2023-05-09 DIAGNOSIS — Z13.220 LIPID SCREENING: ICD-10-CM

## 2023-05-09 PROCEDURE — 99214 OFFICE O/P EST MOD 30 MIN: CPT | Performed by: STUDENT IN AN ORGANIZED HEALTH CARE EDUCATION/TRAINING PROGRAM

## 2023-05-09 NOTE — PROGRESS NOTES
"Chief Complaint  Denia Gage is a 37 y.o. female presenting for ER follow up.     From [facility]. , they have a son (born 2023). Moved to [place] May 2021 for her 's work.  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 Patient woks remotely for [employer]. Has [employer] in general management.      Patient has a past medical history of Graves' disease, postoperative hypothyroidism, proptosis and headaches.    History of Present Illness  Patient is here for follow-up after emergency room visit.    She gave birth her first child/son by  due to PPROM on 2023.  She has recovered well.  She is still at home, but she plans to start working again at her previous job in about 2 weeks.    She was seen in the emergency room on 2023 due to acute severe abdominal pain that lasted about 30 minutes.  Symptoms had subsided when she came to the emergency room.  She was clammy and in a sweat.  They did a CT scan that was reassuring, there was no sign of aortic aneurysm.  There was mildly elevated liver enzymes and lipase, but no evidence of gallstones on the scan.    Since her ED visit she has not had any recurrence of symptoms.  No postprandial symptoms, no abdominal pain or discomfort whatsoever.    She does have follow-up with endocrinology in .    The following portions of the patient's history were reviewed and updated as appropriate: allergies, current medications, past family history, past medical history, past social history, past surgical history and problem list.    Objective  /70 (BP Location: Left arm, Patient Position: Sitting, Cuff Size: Adult)   Pulse 64   Temp 97.5 °F (36.4 °C) (Temporal)   Ht 170.2 cm (67.01\")   Wt 60.6 kg (133 lb 9.6 oz)   BMI 20.92 kg/m²     Physical Exam  Vitals reviewed.   Constitutional:       Appearance: Normal appearance.   HENT:      Head: Normocephalic and atraumatic.      Nose: Nose normal. No congestion.      " Mouth/Throat:      Mouth: Mucous membranes are moist.   Eyes:      Extraocular Movements: Extraocular movements intact.      Conjunctiva/sclera: Conjunctivae normal.   Cardiovascular:      Rate and Rhythm: Normal rate and regular rhythm.      Heart sounds: Normal heart sounds. No murmur heard.  Pulmonary:      Effort: Pulmonary effort is normal.      Breath sounds: Normal breath sounds.   Abdominal:      General: There is no distension.      Palpations: Abdomen is soft. There is no mass.      Tenderness: There is no abdominal tenderness.   Musculoskeletal:      Cervical back: Neck supple.      Right lower leg: No edema.      Left lower leg: No edema.   Skin:     General: Skin is warm and dry.   Neurological:      Mental Status: She is alert and oriented to person, place, and time. Mental status is at baseline.   Psychiatric:         Behavior: Behavior normal.         Thought Content: Thought content normal.         Assessment/Plan   1. Elevated liver enzymes  2. Elevated lipase  Symptoms have not recurred since the emergency room.  Obviously something must of caused the elevation of lipase and liver enzymes.  Potentially she might of had a gallstone that passed and potentially/theoretically could have blocked the papilla to cause obstruction of both common bile duct and pancreatic duct.  Since she is no longer symptomatic I recommend we recheck her enzymes, and if normal I would continue to monitor.  If still elevated we can see about doing an ultrasound to see if they might have missed a stone or blockage.  Patient in agreement.  - Hepatic Function Panel; Future  - Lipase; Future    3. Graves disease  Continue follow-up with endocrinology, blood orders for TSH and free T4 are active in epic    4. Lipid screening  Recommend doing blood work fasting and we can follow-up on annual physical after the summer.  She has upcoming appointment with OB/GYN for Pap smear.  - Lipid Panel; Future    Total time spent on chart  review, charting and face-to-face with patient 30 minutes    Return in about 16 weeks (around 8/29/2023) for Annual physical.    Future Appointments       Provider Department Center    6/21/2023 3:45 PM Florina Hull DO [facility] ENDOCRINOLOGY [facility]          Bentley Doll MD  Family Medicine  05/09/2023

## 2023-05-12 ENCOUNTER — LAB (OUTPATIENT)
Dept: LAB | Facility: HOSPITAL | Age: 38
End: 2023-05-12
Payer: COMMERCIAL

## 2023-05-12 DIAGNOSIS — E89.0 POSTOPERATIVE HYPOTHYROIDISM: ICD-10-CM

## 2023-05-12 DIAGNOSIS — R74.8 ELEVATED LIPASE: ICD-10-CM

## 2023-05-12 DIAGNOSIS — Z13.220 LIPID SCREENING: ICD-10-CM

## 2023-05-12 DIAGNOSIS — R74.8 ELEVATED LIVER ENZYMES: ICD-10-CM

## 2023-05-12 LAB
ALBUMIN SERPL-MCNC: 4.8 G/DL (ref 3.5–5.2)
ALP SERPL-CCNC: 61 U/L (ref 39–117)
ALT SERPL W P-5'-P-CCNC: 25 U/L (ref 1–33)
AST SERPL-CCNC: 30 U/L (ref 1–32)
BILIRUB CONJ SERPL-MCNC: <0.2 MG/DL (ref 0–0.3)
BILIRUB INDIRECT SERPL-MCNC: NORMAL MG/DL
BILIRUB SERPL-MCNC: 0.2 MG/DL (ref 0–1.2)
CHOLEST SERPL-MCNC: 225 MG/DL (ref 0–200)
HDLC SERPL-MCNC: 70 MG/DL (ref 40–60)
LDLC SERPL CALC-MCNC: 140 MG/DL (ref 0–100)
LDLC/HDLC SERPL: 1.97 {RATIO}
LIPASE SERPL-CCNC: 51 U/L (ref 13–60)
PROT SERPL-MCNC: 7.4 G/DL (ref 6–8.5)
T4 FREE SERPL-MCNC: 1.44 NG/DL (ref 0.93–1.7)
TRIGL SERPL-MCNC: 85 MG/DL (ref 0–150)
TSH SERPL DL<=0.05 MIU/L-ACNC: 0.54 UIU/ML (ref 0.27–4.2)
VLDLC SERPL-MCNC: 15 MG/DL (ref 5–40)

## 2023-05-12 PROCEDURE — 36415 COLL VENOUS BLD VENIPUNCTURE: CPT

## 2023-05-12 PROCEDURE — 83690 ASSAY OF LIPASE: CPT

## 2023-05-12 PROCEDURE — 80061 LIPID PANEL: CPT

## 2023-05-12 PROCEDURE — 84439 ASSAY OF FREE THYROXINE: CPT

## 2023-05-12 PROCEDURE — 84443 ASSAY THYROID STIM HORMONE: CPT

## 2023-05-12 PROCEDURE — 80076 HEPATIC FUNCTION PANEL: CPT

## 2023-05-13 ENCOUNTER — PATIENT MESSAGE (OUTPATIENT)
Dept: INTERNAL MEDICINE | Facility: CLINIC | Age: 38
End: 2023-05-13
Payer: COMMERCIAL

## 2023-05-13 DIAGNOSIS — R10.13 EPIGASTRIC PAIN: Primary | ICD-10-CM

## 2023-06-11 ENCOUNTER — HOSPITAL ENCOUNTER (OUTPATIENT)
Dept: ULTRASOUND IMAGING | Facility: HOSPITAL | Age: 38
Discharge: HOME | End: 2023-06-11
Admitting: STUDENT IN AN ORGANIZED HEALTH CARE EDUCATION/TRAINING PROGRAM
Payer: COMMERCIAL

## 2023-06-11 DIAGNOSIS — R10.13 EPIGASTRIC PAIN: ICD-10-CM

## 2023-06-11 PROCEDURE — 76705 ECHO EXAM OF ABDOMEN: CPT

## 2023-08-08 ENCOUNTER — OFFICE VISIT (OUTPATIENT)
Dept: ENDOCRINOLOGY | Facility: CLINIC | Age: 38
End: 2023-08-08
Payer: COMMERCIAL

## 2023-08-08 VITALS
HEART RATE: 83 BPM | WEIGHT: 130 LBS | OXYGEN SATURATION: 98 % | DIASTOLIC BLOOD PRESSURE: 68 MMHG | HEIGHT: 67 IN | SYSTOLIC BLOOD PRESSURE: 110 MMHG | BODY MASS INDEX: 20.4 KG/M2

## 2023-08-08 DIAGNOSIS — E89.0 POSTOPERATIVE HYPOTHYROIDISM: Primary | ICD-10-CM

## 2023-08-08 LAB
T4 FREE SERPL-MCNC: 1.51 NG/DL (ref 0.93–1.7)
TSH SERPL DL<=0.05 MIU/L-ACNC: 0.57 UIU/ML (ref 0.27–4.2)

## 2023-08-08 PROCEDURE — 84443 ASSAY THYROID STIM HORMONE: CPT | Performed by: INTERNAL MEDICINE

## 2023-08-08 PROCEDURE — 84439 ASSAY OF FREE THYROXINE: CPT | Performed by: INTERNAL MEDICINE

## 2023-08-08 PROCEDURE — 99213 OFFICE O/P EST LOW 20 MIN: CPT | Performed by: INTERNAL MEDICINE

## 2023-08-08 PROCEDURE — 36415 COLL VENOUS BLD VENIPUNCTURE: CPT | Performed by: INTERNAL MEDICINE

## 2023-08-08 RX ORDER — NORGESTIMATE AND ETHINYL ESTRADIOL 7DAYSX3 28
KIT ORAL
COMMUNITY
Start: 2023-08-02

## 2023-08-08 NOTE — PROGRESS NOTES
"Chief Complaint   Patient presents with    Hypothyroidism        HPI   Denia Gage is a 37 y.o. female had concerns including Hypothyroidism.      Here for follow-up on hypothyroidism.  On levothyroxine 88 mcg daily.  Thyroid levels in May were normal and optimal range.  She feels great.  Prior to pregnancy was on 100 mcg, was on higher dose for short duration during pregnancy but TSH became suppressed, T4 high and dose was reduced.  She feels well.  Had a  about 6 months ago.  Baby is doing well.    Is planning for another pregnancy, likely sometime around [place], first part of next year.    The following portions of the patient's history were reviewed and updated as appropriate: allergies, current medications, past family history, past medical history, past social history, past surgical history, and problem list.    Review of Systems   Constitutional: Negative.    Endocrine: Negative.       /68   Pulse 83   Ht 170.2 cm (67\")   Wt 59 kg (130 lb)   SpO2 98%   BMI 20.36 kg/mý      Physical Exam  Vitals reviewed.   Constitutional:       Appearance: Normal appearance.   Cardiovascular:      Rate and Rhythm: Normal rate.   Pulmonary:      Effort: Pulmonary effort is normal.   Neurological:      General: No focal deficit present.      Mental Status: She is alert. Mental status is at baseline.   Psychiatric:         Mood and Affect: Mood normal.         Behavior: Behavior normal.            LABS AND IMAGING   CMP  Lab Results   Component Value Date    GLUCOSE 100 (H) 2023    BUN 18 2023    CREATININE 0.72 2023    EGFRIFNONA >60 2021    EGFRIFAFRI >60 2021    BCR 25.0 2023    K 4.0 2023    CO2 28.0 2023    CALCIUM 9.6 2023    ALBUMIN 4.8 2023    AST 30 2023    ALT 25 2023        CBC w/DIFF   Lab Results   Component Value Date    WBC 12.72 (H) 2023    RBC 4.38 2023    HGB 12.4 2023    HCT 39.0 " 04/19/2023    MCV 89.0 04/19/2023    MCH 28.3 04/19/2023    MCHC 31.8 04/19/2023    RDW 12.3 04/19/2023    RDWSD 40.3 04/19/2023    MPV 8.8 04/19/2023     04/19/2023    NEUTRORELPCT 74.2 04/19/2023    LYMPHORELPCT 17.0 (L) 04/19/2023    MONORELPCT 7.4 04/19/2023    EOSRELPCT 0.6 04/19/2023    BASORELPCT 0.4 04/19/2023    AUTOIGPER 0.4 04/19/2023    NEUTROABS 9.44 (H) 04/19/2023    LYMPHSABS 2.16 04/19/2023    MONOSABS 0.94 (H) 04/19/2023    EOSABS 0.08 04/19/2023    BASOSABS 0.05 04/19/2023    AUTOIGNUM 0.05 04/19/2023    NRBC 0.0 04/19/2023       TSH  Lab Results   Component Value Date    TSH 0.543 05/12/2023    TSH 0.135 (L) 03/27/2023    TSH 0.462 02/11/2023     T4  Lab Results   Component Value Date    FREET4 1.44 05/12/2023    FREET4 1.60 03/27/2023    FREET4 1.39 02/11/2023     Lab Results   Component Value Date    H6WQYSD 16.20 (H) 09/12/2022    A0IWJCA 14.30 (H) 08/15/2022    R5KKDUK 10.30 07/14/2022       T3  Lab Results   Component Value Date    T3FREE >32.55 (H) 05/17/2019       Assessment and Plan    Diagnoses and all orders for this visit:    1. Postoperative hypothyroidism (Primary)  S/p thyroidectomy 1/7/2021 for Graves disease with proptosis.  Now 6 months postpartum.  Prepregnancy dose was 100 mcg, dose was increased for short time during pregnancy but then T4 trended up, TSH became suppressed.  She is clinically euthyroid on 88 mcg daily.  Recheck TFTs today.  If stable, recheck again in 5 to 6 months.  Planning/hoping for another pregnancy in the early part of next year.  -     T4, Free  -     TSH         Return in about 5 months (around 1/8/2024) for next scheduled follow up. The patient was instructed to contact the clinic with any interval questions or concerns.    Florina Hull, DO   Endocrinologist    Please note that portions of this note were completed with a voice recognition program.

## 2023-08-10 DIAGNOSIS — E89.0 POSTOPERATIVE HYPOTHYROIDISM: ICD-10-CM

## 2023-08-10 RX ORDER — LEVOTHYROXINE SODIUM 88 UG/1
88 TABLET ORAL DAILY
Qty: 90 TABLET | Refills: 2 | Status: SHIPPED | OUTPATIENT
Start: 2023-08-10

## 2024-01-16 ENCOUNTER — OFFICE VISIT (OUTPATIENT)
Dept: ENDOCRINOLOGY | Facility: CLINIC | Age: 39
End: 2024-01-16
Payer: COMMERCIAL

## 2024-01-16 VITALS
SYSTOLIC BLOOD PRESSURE: 108 MMHG | OXYGEN SATURATION: 98 % | BODY MASS INDEX: 20.09 KG/M2 | HEART RATE: 79 BPM | DIASTOLIC BLOOD PRESSURE: 73 MMHG | WEIGHT: 128 LBS | HEIGHT: 67 IN

## 2024-01-16 DIAGNOSIS — E89.0 POSTOPERATIVE HYPOTHYROIDISM: Primary | ICD-10-CM

## 2024-01-16 LAB
T4 FREE SERPL-MCNC: 1.59 NG/DL (ref 0.93–1.7)
TSH SERPL DL<=0.05 MIU/L-ACNC: 0.64 UIU/ML (ref 0.27–4.2)

## 2024-01-16 PROCEDURE — 84439 ASSAY OF FREE THYROXINE: CPT | Performed by: INTERNAL MEDICINE

## 2024-01-16 PROCEDURE — 84443 ASSAY THYROID STIM HORMONE: CPT | Performed by: INTERNAL MEDICINE

## 2024-01-16 PROCEDURE — 36415 COLL VENOUS BLD VENIPUNCTURE: CPT | Performed by: INTERNAL MEDICINE

## 2024-01-16 PROCEDURE — 99213 OFFICE O/P EST LOW 20 MIN: CPT | Performed by: INTERNAL MEDICINE

## 2024-01-16 NOTE — PROGRESS NOTES
"Chief Complaint   Patient presents with    Hypothyroidism        HPI   Denia Gage is a 38 y.o. female had concerns including Hypothyroidism.      Struggling with sleep. Her baby is now 11 months and sleeping through the night but she is still waking overnight. Seems to be improving since sleep training the baby.     No s/s of hyperthyroidism.    May try for another pregnancy within 1-5 months.      The following portions of the patient's history were reviewed and updated as appropriate: allergies, current medications, past family history, past medical history, past social history, past surgical history, and problem list.    Review of Systems   Psychiatric/Behavioral:  Positive for sleep disturbance. The patient is nervous/anxious.         /73   Pulse 79   Ht 170.2 cm (67\")   Wt 58.1 kg (128 lb)   SpO2 98%   BMI 20.05 kg/m²      Physical Exam  Vitals reviewed.   Constitutional:       Appearance: Normal appearance.   Eyes:      Comments: proptosis   Cardiovascular:      Rate and Rhythm: Normal rate.   Pulmonary:      Effort: Pulmonary effort is normal.   Neurological:      General: No focal deficit present.      Mental Status: She is alert. Mental status is at baseline.   Psychiatric:         Mood and Affect: Mood normal.         Behavior: Behavior normal.              LABS AND IMAGING   CMP  Lab Results   Component Value Date    GLUCOSE 100 (H) 04/19/2023    BUN 18 04/19/2023    CREATININE 0.72 04/19/2023    EGFRIFNONA >60 06/24/2021    EGFRIFAFRI >60 06/24/2021    BCR 25.0 04/19/2023    K 4.0 04/19/2023    CO2 28.0 04/19/2023    CALCIUM 9.6 04/19/2023    ALBUMIN 4.8 05/12/2023    AST 30 05/12/2023    ALT 25 05/12/2023        CBC w/DIFF   Lab Results   Component Value Date    WBC 12.72 (H) 04/19/2023    RBC 4.38 04/19/2023    HGB 12.4 04/19/2023    HCT 39.0 04/19/2023    MCV 89.0 04/19/2023    MCH 28.3 04/19/2023    MCHC 31.8 04/19/2023    RDW 12.3 04/19/2023    RDWSD 40.3 04/19/2023    MPV 8.8 " 04/19/2023     04/19/2023    NEUTRORELPCT 74.2 04/19/2023    LYMPHORELPCT 17.0 (L) 04/19/2023    MONORELPCT 7.4 04/19/2023    EOSRELPCT 0.6 04/19/2023    BASORELPCT 0.4 04/19/2023    AUTOIGPER 0.4 04/19/2023    NEUTROABS 9.44 (H) 04/19/2023    LYMPHSABS 2.16 04/19/2023    MONOSABS 0.94 (H) 04/19/2023    EOSABS 0.08 04/19/2023    BASOSABS 0.05 04/19/2023    AUTOIGNUM 0.05 04/19/2023    NRBC 0.0 04/19/2023     TSH  Lab Results   Component Value Date    TSH 0.571 08/08/2023    TSH 0.543 05/12/2023    TSH 0.135 (L) 03/27/2023     T4  Lab Results   Component Value Date    FREET4 1.51 08/08/2023    FREET4 1.44 05/12/2023    FREET4 1.60 03/27/2023     Lab Results   Component Value Date    Y2UHCFO 16.20 (H) 09/12/2022    O9AVCMK 14.30 (H) 08/15/2022    X2ORHIV 10.30 07/14/2022     T3  Lab Results   Component Value Date    T3FREE >32.55 (H) 05/17/2019       Assessment and Plan    Diagnoses and all orders for this visit:    1. Postoperative hypothyroidism (Primary)  S/p thyroidectomy 1/7/2021 for Graves disease with proptosis.  Clinically euthyroid on levothyroxine 88 mcg daily. Now 11 months postpartum. Dose was reduced late in pregnancy for suppressed TSH.  Prepregnancy dose was 100 mcg.  Labs today. Call/message at first positive pregnancy test for close monitoring of TFTs in pregnancy.  -     T4, Free  -     TSH         Return in about 5 months (around 6/16/2024) for next scheduled follow up. The patient was instructed to contact the clinic with any interval questions or concerns.    Florina Hull, DO   Endocrinologist    Please note that portions of this note were completed with a voice recognition program.

## 2024-01-17 DIAGNOSIS — E89.0 POSTOPERATIVE HYPOTHYROIDISM: ICD-10-CM

## 2024-01-17 RX ORDER — LEVOTHYROXINE SODIUM 88 UG/1
88 TABLET ORAL DAILY
Qty: 90 TABLET | Refills: 2 | Status: SHIPPED | OUTPATIENT
Start: 2024-01-17

## 2024-06-08 ENCOUNTER — TRANSCRIBE ORDERS (OUTPATIENT)
Dept: LAB | Facility: HOSPITAL | Age: 39
End: 2024-06-08
Payer: COMMERCIAL

## 2024-06-08 ENCOUNTER — LAB (OUTPATIENT)
Dept: LAB | Facility: HOSPITAL | Age: 39
End: 2024-06-08
Payer: COMMERCIAL

## 2024-06-08 DIAGNOSIS — Z01.419 ROUTINE GYNECOLOGICAL EXAMINATION: Primary | ICD-10-CM

## 2024-06-08 DIAGNOSIS — Z01.419 ROUTINE GYNECOLOGICAL EXAMINATION: ICD-10-CM

## 2024-06-08 LAB
T4 FREE SERPL-MCNC: 1.74 NG/DL (ref 0.92–1.68)
TSH SERPL DL<=0.05 MIU/L-ACNC: 0.21 UIU/ML (ref 0.27–4.2)

## 2024-06-08 PROCEDURE — 36415 COLL VENOUS BLD VENIPUNCTURE: CPT

## 2024-06-08 PROCEDURE — 84443 ASSAY THYROID STIM HORMONE: CPT

## 2024-06-08 PROCEDURE — 84439 ASSAY OF FREE THYROXINE: CPT

## 2024-06-26 ENCOUNTER — OFFICE VISIT (OUTPATIENT)
Dept: ENDOCRINOLOGY | Facility: CLINIC | Age: 39
End: 2024-06-26
Payer: COMMERCIAL

## 2024-06-26 VITALS
BODY MASS INDEX: 19.42 KG/M2 | OXYGEN SATURATION: 98 % | DIASTOLIC BLOOD PRESSURE: 76 MMHG | WEIGHT: 124 LBS | HEART RATE: 90 BPM | SYSTOLIC BLOOD PRESSURE: 120 MMHG

## 2024-06-26 DIAGNOSIS — E89.0 POSTOPERATIVE HYPOTHYROIDISM: Primary | ICD-10-CM

## 2024-06-26 DIAGNOSIS — G47.00 INSOMNIA, UNSPECIFIED TYPE: ICD-10-CM

## 2024-06-26 DIAGNOSIS — R63.4 WEIGHT LOSS: ICD-10-CM

## 2024-06-26 PROCEDURE — 99214 OFFICE O/P EST MOD 30 MIN: CPT | Performed by: INTERNAL MEDICINE

## 2024-06-26 RX ORDER — LEVOTHYROXINE SODIUM 0.07 MG/1
75 TABLET ORAL DAILY
Qty: 30 TABLET | Refills: 5 | Status: SHIPPED | OUTPATIENT
Start: 2024-06-26

## 2024-06-26 NOTE — PROGRESS NOTES
Chief Complaint   Patient presents with    Hypothyroidism        HPI   Denia Gage is a 38 y.o. female had concerns including Hypothyroidism.      Weight is down about 6 lbs from August 2023.   Appetite is increased. Weight trending down. Notes more insomnia and dry eyes.   Insomnia comes in waves. Her baby is sleeping good.     She has tried unisom, melatonin with no benefit. But was waiting to take until she woke overnight.   Has no issue falling to sleep but wakes overnight.     The following portions of the patient's history were reviewed and updated as appropriate: allergies, current medications, past family history, past medical history, past social history, past surgical history, and problem list.    Review of Systems   Constitutional:  Positive for unexpected weight loss.   Eyes:         Dry eyes   Psychiatric/Behavioral:  Positive for sleep disturbance.         /76   Pulse 90   Wt 56.2 kg (124 lb)   SpO2 98%   Breastfeeding No   BMI 19.42 kg/m²      Physical Exam  Vitals reviewed.   Constitutional:       Appearance: Normal appearance.   Cardiovascular:      Rate and Rhythm: Normal rate.   Pulmonary:      Effort: Pulmonary effort is normal.   Neurological:      General: No focal deficit present.      Mental Status: She is alert. Mental status is at baseline.   Psychiatric:         Mood and Affect: Mood normal.         Behavior: Behavior normal.              LABS AND IMAGING   CMP  Lab Results   Component Value Date    GLUCOSE 100 (H) 04/19/2023    BUN 18 04/19/2023    CREATININE 0.72 04/19/2023    EGFRIFNONA >60 06/24/2021    EGFRIFAFRI >60 06/24/2021    BCR 25.0 04/19/2023    K 4.0 04/19/2023    CO2 28.0 04/19/2023    CALCIUM 9.6 04/19/2023    ALBUMIN 4.8 05/12/2023    AST 30 05/12/2023    ALT 25 05/12/2023        CBC w/DIFF   Lab Results   Component Value Date    WBC 12.72 (H) 04/19/2023    RBC 4.38 04/19/2023    HGB 12.4 04/19/2023    HCT 39.0 04/19/2023    MCV 89.0 04/19/2023    MCH  28.3 04/19/2023    MCHC 31.8 04/19/2023    RDW 12.3 04/19/2023    RDWSD 40.3 04/19/2023    MPV 8.8 04/19/2023     04/19/2023    NEUTRORELPCT 74.2 04/19/2023    LYMPHORELPCT 17.0 (L) 04/19/2023    MONORELPCT 7.4 04/19/2023    EOSRELPCT 0.6 04/19/2023    BASORELPCT 0.4 04/19/2023    AUTOIGPER 0.4 04/19/2023    NEUTROABS 9.44 (H) 04/19/2023    LYMPHSABS 2.16 04/19/2023    MONOSABS 0.94 (H) 04/19/2023    EOSABS 0.08 04/19/2023    BASOSABS 0.05 04/19/2023    AUTOIGNUM 0.05 04/19/2023    NRBC 0.0 04/19/2023     TSH  Lab Results   Component Value Date    TSH 0.214 (L) 06/08/2024    TSH 0.641 01/16/2024    TSH 0.571 08/08/2023     T4  Lab Results   Component Value Date    FREET4 1.74 (H) 06/08/2024    FREET4 1.59 01/16/2024    FREET4 1.51 08/08/2023     Lab Results   Component Value Date    E2BQLEU 16.20 (H) 09/12/2022    S9XLHEO 14.30 (H) 08/15/2022    Z7VVYWI 10.30 07/14/2022     T3  Lab Results   Component Value Date    T3FREE >32.55 (H) 05/17/2019       Assessment and Plan    Diagnoses and all orders for this visit:    1. Postoperative hypothyroidism (Primary)  S/p thyroidectomy 1/7/2021 for Graves disease with proptosis.  On levothyroxine 88 mcg daily.  TSH recently mildly suppressed, T4 minimally above the upper limit of normal.  In recent weeks she has developed insomnia, unintentional weight loss, appetite is increased.  Recommend dose reduction to 75 mcg at this time.  Repeat labs in about 6 weeks.   If she has a positive pregnancy test, call the office.  Dose may need to be increased during hopeful future pregnancy.  -     levothyroxine (SYNTHROID, LEVOTHROID) 75 MCG tablet; Take 1 tablet by mouth Daily.  Dispense: 30 tablet; Refill: 5  -     T4, Free; Future  -     TSH; Future    2. Weight loss  Suspect in part due to mild hyperthyroidism.  Management as above.    3. Insomnia, unspecified type  Mild hyperthyroidism likely contributing.  Dose adjustment as above.  Recommended OTC Unisom  about 30 minutes  prior to sleep.       Return in about 6 months (around 12/26/2024) for next scheduled follow up. The patient was instructed to contact the clinic with any interval questions or concerns.    Electronically signed by: Florina Hull DO   Endocrinologist    Please note that portions of this note were completed with a voice recognition program.

## 2024-08-10 ENCOUNTER — LAB (OUTPATIENT)
Dept: LAB | Facility: HOSPITAL | Age: 39
End: 2024-08-10
Payer: COMMERCIAL

## 2024-08-10 DIAGNOSIS — E89.0 POSTOPERATIVE HYPOTHYROIDISM: ICD-10-CM

## 2024-08-10 LAB
T4 FREE SERPL-MCNC: 1.45 NG/DL (ref 0.92–1.68)
TSH SERPL DL<=0.05 MIU/L-ACNC: 0.23 UIU/ML (ref 0.27–4.2)

## 2024-08-10 PROCEDURE — 84439 ASSAY OF FREE THYROXINE: CPT

## 2024-08-10 PROCEDURE — 84443 ASSAY THYROID STIM HORMONE: CPT

## 2024-08-13 DIAGNOSIS — E89.0 POSTOPERATIVE HYPOTHYROIDISM: ICD-10-CM

## 2024-08-13 RX ORDER — LEVOTHYROXINE SODIUM 0.07 MG/1
TABLET ORAL
Qty: 26 TABLET | Refills: 5 | Status: SHIPPED | OUTPATIENT
Start: 2024-08-13

## 2024-09-30 DIAGNOSIS — E89.0 POSTOPERATIVE HYPOTHYROIDISM: Primary | ICD-10-CM

## 2024-10-03 ENCOUNTER — LAB (OUTPATIENT)
Dept: LAB | Facility: HOSPITAL | Age: 39
End: 2024-10-03
Payer: COMMERCIAL

## 2024-10-03 DIAGNOSIS — E89.0 POSTOPERATIVE HYPOTHYROIDISM: ICD-10-CM

## 2024-10-03 PROCEDURE — 84443 ASSAY THYROID STIM HORMONE: CPT

## 2024-10-03 PROCEDURE — 84702 CHORIONIC GONADOTROPIN TEST: CPT

## 2024-10-03 PROCEDURE — 84439 ASSAY OF FREE THYROXINE: CPT

## 2024-10-04 LAB
T4 FREE SERPL-MCNC: 1.2 NG/DL (ref 0.92–1.68)
TSH SERPL DL<=0.05 MIU/L-ACNC: 2.72 UIU/ML (ref 0.27–4.2)

## 2024-10-05 DIAGNOSIS — E89.0 POSTOPERATIVE HYPOTHYROIDISM: ICD-10-CM

## 2024-10-05 LAB — HCG INTACT+B SERPL-ACNC: 7782 MIU/ML

## 2024-10-05 RX ORDER — LEVOTHYROXINE SODIUM 75 UG/1
75 TABLET ORAL DAILY
Qty: 30 TABLET | Refills: 5 | Status: SHIPPED | OUTPATIENT
Start: 2024-10-05

## 2024-10-08 ENCOUNTER — TELEPHONE (OUTPATIENT)
Dept: OBSTETRICS AND GYNECOLOGY | Facility: CLINIC | Age: 39
End: 2024-10-08

## 2024-10-08 NOTE — TELEPHONE ENCOUNTER
Hub staff attempted to follow warm transfer process and was unsuccessful     Caller: Denia Gage    Relationship to patient: Self    Best call back number: [phone] / LVM    Patient is needing: NEW PT - NOT EST - NEW OB APPT W/ U/S SCHEDULED FOR 10/23/24 W/ DR SANDOVAL - PT IS WANTING TO KNOW IF 10/25/24 IS AVAILABLE AROUND 2PM OR LATER W/ DR SANABRIA - PT SAID SHE REALLY WANTS TO SEE DR SANABRIA BUT WILL SEE ANYONE    PLEASE CALL THE PT TO R/S    THANK YOU!

## 2024-10-30 ENCOUNTER — INITIAL PRENATAL (OUTPATIENT)
Dept: OBSTETRICS AND GYNECOLOGY | Facility: CLINIC | Age: 39
End: 2024-10-30
Payer: COMMERCIAL

## 2024-10-30 VITALS — WEIGHT: 139.6 LBS | DIASTOLIC BLOOD PRESSURE: 80 MMHG | BODY MASS INDEX: 21.86 KG/M2 | SYSTOLIC BLOOD PRESSURE: 128 MMHG

## 2024-10-30 DIAGNOSIS — O09.529 ANTEPARTUM MULTIGRAVIDA OF ADVANCED MATERNAL AGE: ICD-10-CM

## 2024-10-30 DIAGNOSIS — Z98.891 S/P CESAREAN SECTION: ICD-10-CM

## 2024-10-30 DIAGNOSIS — Z3A.09 9 WEEKS GESTATION OF PREGNANCY: Primary | ICD-10-CM

## 2024-10-30 DIAGNOSIS — E05.00 GRAVES DISEASE: Chronic | ICD-10-CM

## 2024-10-30 NOTE — PROGRESS NOTES
Initial ob visit     CC- Here for care of pregnancy        Denia Gage is a 39 y.o. female, , who presents for her first obstetrical visit.  Patient's last menstrual period was 2024.. Her NICOLASA is 2025, by Last Menstrual Period. Current GA is 9w2d.     Initial positive test date : 2024, UPT        Her periods are every 28 days.  Prior obstetric issues: fetal intolerance and baby was born with graves disease  Patient's past medical history is significant for:  graves disease .  Family history of genetic issues (includes FOB): none  Prior infections concerning in pregnancy (Rash, fever in last 2 weeks): No  Varicella Hx - history of chicken pox  Prior testing for Cystic Fibrosis Carrier or Sickle Cell Trait- none  Prepregnancy BMI - Body mass index is 21.86 kg/m².  History of STD: no  Hx of HSV for patient or partner: no  Ultrasound Today: yes    OB History    Para Term  AB Living   2 1   1   1   SAB IAB Ectopic Molar Multiple Live Births           0 1      # Outcome Date GA Lbr Juan/2nd Weight Sex Type Anes PTL Lv   2 Current            1  23 36w2d  2764 g (6 lb 1.5 oz) M CS-LTranv EPI Y ARNOLDO      Complications: Fetal Intolerance       Additional Pertinent History   Last Pap : approx. 2 years ago Result: negative HPV: negative (Done at Dr. Darling office)     Last Completed Pap Smear       This patient has no relevant Health Maintenance data.          History of abnormal Pap smear: no  Family history of uterine, colon, breast, or ovarian cancer: yes - Maternal Aunt had ovarian and uterine cancer  Feelings of Anxiety or Depression: no  Tobacco Usage?: No   Alcohol/Drug Use?: NO  Over the age of 35 at delivery: no  Genetic Screening: desires to discuss with  for possibly getting test in the future    PMH    Current Outpatient Medications:     levothyroxine (SYNTHROID, LEVOTHROID) 75 MCG tablet, Take 1 tablet by mouth Daily., Disp: 30 tablet, Rfl: 5     Prenatal Vit-Fe Fumarate-FA (PRENATAL VITAMINS PO), Take  by mouth., Disp: , Rfl:      Past Medical History:   Diagnosis Date    Graves disease 2020    Was on methimazole and atenolol, followed with [facility]. - s/p thyroidectomy - [facility]    History of headache 2020    Resolved after thyroidectomy 2021    Hypothyroidism     postoperative, thyroidectomy 21 for Graves disease    Varicella         Past Surgical History:   Procedure Laterality Date     SECTION N/A 2023    Procedure:  SECTION PRIMARY;  Surgeon: Sugar Darling MD;  Location: [facility] LABOR DELIVERY;  Service: Obstetrics/Gynecology;  Laterality: N/A;    GANGLION CYST EXCISION Left 2021    Dorsal wrist    HYMENECTOMY      THYROIDECTOMY  2021    For Graves disease, difficult to manage       Review of Systems   Review of Systems    Patient Reports:  nauseous  Patient Denies:excessive nausea , excessive vomiting, and vaginal bleeding  All systems reviewed and otherwise normal.    I have reviewed and agree with the HPI, ROS, and historical information as entered above. Angel Moran MD      /80   Wt 63.3 kg (139 lb 9.6 oz)   LMP 2024   BMI 21.86 kg/m²     The additional following portions of the patient's history were reviewed and updated as appropriate: allergies, current medications, past family history, past medical history, past social history, past surgical history, and problem list.    Physical Exam  General:  well developed; well nourished  no acute distress  mentation appropriate   Chest/Respiratory: No labored breathing, normal respiratory effort, normal appearance, no respiratory noises noted   Heart:  normal rate, regular rhythm,  no murmurs, rubs, or gallops   Thyroid: normal to inspection and palpation   Breasts:  Not performed.   Abdomen: soft, non-tender; no masses  no umbilical or inguinal hernias are present  no hepato-splenomegaly    Pelvis: Not performed.        Assessment and Plan    Problem List Items Addressed This Visit       Graves disease (Chronic)    Overview     Was on methimazone and atenolol, followed with [facility].  - s/p thyroidectomy  - followed with ophthalmologist at [facility].          Relevant Medications    levothyroxine (SYNTHROID, LEVOTHROID) 75 MCG tablet    Other Relevant Orders    TSH+Free T4    Pregnancy - Primary    Relevant Orders    Obstetric Panel    HIV-1 / O / 2 Ag / Antibody    Urine Culture - Urine, Urine, Clean Catch    Urinalysis With Microscopic - Urine, Clean Catch    Chlamydia trachomatis, Neisseria gonorrhoeae, PCR - Urine, Urine, Clean Catch    Urine Drug Screen - Urine, Clean Catch    TSH+Free T4    S/P  section    Antepartum multigravida of advanced maternal age       Pregnancy at 9w2d  Reviewed routine prenatal care with the office and educational materials given  Lab(s) Ordered  Discussed options for genetic testing including first trimester nuchal translucency screen, genetic disease carrier testing, quadruple screen, and NIPT  Patient is on Prenatal vitamins  discussed baby aspirin from 12 weeks to delivery for prevention of preeclampsia   Return in about 4 weeks (around 2024) for Routine prenatal care.      Angel Moran MD  10/30/2024

## 2024-10-31 LAB
ABO GROUP BLD: ABNORMAL
APPEARANCE UR: CLEAR
BACTERIA #/AREA URNS HPF: NORMAL /HPF
BASOPHILS # BLD AUTO: 0 X10E3/UL (ref 0–0.2)
BASOPHILS NFR BLD AUTO: 1 %
BILIRUB UR QL STRIP: NEGATIVE
BLD GP AB SCN SERPL QL: NEGATIVE
C TRACH RRNA SPEC QL NAA+PROBE: NEGATIVE
COLOR UR: YELLOW
EOSINOPHIL # BLD AUTO: 0.1 X10E3/UL (ref 0–0.4)
EOSINOPHIL NFR BLD AUTO: 1 %
EPI CELLS #/AREA URNS HPF: NORMAL /HPF
ERYTHROCYTE [DISTWIDTH] IN BLOOD BY AUTOMATED COUNT: 13 % (ref 11.7–15.4)
GLUCOSE UR QL STRIP: NEGATIVE
HBV SURFACE AG SERPL QL IA: NEGATIVE
HCT VFR BLD AUTO: 44.2 % (ref 34–46.6)
HCV IGG SERPL QL IA: NON REACTIVE
HGB BLD-MCNC: 13.7 G/DL (ref 11.1–15.9)
HGB UR QL STRIP: NEGATIVE
HIV 1+2 AB+HIV1 P24 AG SERPL QL IA: NON REACTIVE
IMM GRANULOCYTES # BLD AUTO: 0 X10E3/UL (ref 0–0.1)
IMM GRANULOCYTES NFR BLD AUTO: 1 %
KETONES UR QL STRIP: NEGATIVE
LEUKOCYTE ESTERASE UR QL STRIP: NEGATIVE
LYMPHOCYTES # BLD AUTO: 2 X10E3/UL (ref 0.7–3.1)
LYMPHOCYTES NFR BLD AUTO: 30 %
MCH RBC QN AUTO: 29 PG (ref 26.6–33)
MCHC RBC AUTO-ENTMCNC: 31 G/DL (ref 31.5–35.7)
MCV RBC AUTO: 93 FL (ref 79–97)
MONOCYTES # BLD AUTO: 0.6 X10E3/UL (ref 0.1–0.9)
MONOCYTES NFR BLD AUTO: 10 %
N GONORRHOEA RRNA SPEC QL NAA+PROBE: NEGATIVE
NEUTROPHILS # BLD AUTO: 3.9 X10E3/UL (ref 1.4–7)
NEUTROPHILS NFR BLD AUTO: 57 %
NITRITE UR QL STRIP: NEGATIVE
PH UR STRIP: 7 [PH] (ref 5–8)
PLATELET # BLD AUTO: 363 X10E3/UL (ref 150–450)
PROT UR QL STRIP: NEGATIVE
RBC # BLD AUTO: 4.73 X10E6/UL (ref 3.77–5.28)
RBC #/AREA URNS HPF: NORMAL /HPF
RH BLD: POSITIVE
RPR SER QL: NON REACTIVE
RUBV IGG SERPL IA-ACNC: 0.9 INDEX
SP GR UR STRIP: 1 (ref 1–1.03)
T4 FREE SERPL-MCNC: 1.33 NG/DL (ref 0.92–1.68)
TSH SERPL DL<=0.005 MIU/L-ACNC: 3.03 UIU/ML (ref 0.27–4.2)
UROBILINOGEN UR STRIP-MCNC: NORMAL MG/DL
WBC # BLD AUTO: 6.6 X10E3/UL (ref 3.4–10.8)
WBC #/AREA URNS HPF: NORMAL /HPF

## 2024-11-01 LAB
AMPHETAMINES UR QL SCN: NEGATIVE NG/ML
BARBITURATES UR QL SCN: NEGATIVE NG/ML
BENZODIAZ UR QL SCN: NEGATIVE NG/ML
BZE UR QL SCN: NEGATIVE NG/ML
CANNABINOIDS UR QL SCN: NEGATIVE NG/ML
CREAT UR-MCNC: 17.4 MG/DL (ref 20–300)
LABORATORY COMMENT REPORT: ABNORMAL
METHADONE UR QL SCN: NEGATIVE NG/ML
OPIATES UR QL SCN: NEGATIVE NG/ML
OXYCODONE+OXYMORPHONE UR QL SCN: NEGATIVE NG/ML
PCP UR QL: NEGATIVE NG/ML
PH UR: 6.4 [PH] (ref 4.5–8.9)
PROPOXYPH UR QL SCN: NEGATIVE NG/ML
SP GR UR: 1

## 2024-11-03 LAB
BACTERIA UR CULT: ABNORMAL
OTHER ANTIBIOTIC SUSC ISLT: ABNORMAL

## 2024-11-04 DIAGNOSIS — E89.0 POSTOPERATIVE HYPOTHYROIDISM: ICD-10-CM

## 2024-11-04 RX ORDER — NITROFURANTOIN 25; 75 MG/1; MG/1
100 CAPSULE ORAL 2 TIMES DAILY
Qty: 14 CAPSULE | Refills: 0 | Status: SHIPPED | OUTPATIENT
Start: 2024-11-04 | End: 2024-11-11

## 2024-11-04 RX ORDER — LEVOTHYROXINE SODIUM 88 UG/1
88 TABLET ORAL DAILY
Qty: 30 TABLET | Refills: 5 | Status: SHIPPED | OUTPATIENT
Start: 2024-11-04

## 2024-11-11 ENCOUNTER — TELEPHONE (OUTPATIENT)
Dept: OBSTETRICS AND GYNECOLOGY | Facility: CLINIC | Age: 39
End: 2024-11-11
Payer: COMMERCIAL

## 2024-11-11 NOTE — TELEPHONE ENCOUNTER
Spoke with pt, she wanted to clarify because she was told she had a UTI and needed to take the Macrobid, but she isn't having any symptoms. Discussed that in pregnancy physiologic changes increase the risk for UTI progressing into kidney infection, so we treat any amount of bacteruria, even if asymptomatic. She verbalizes understanding and will start the medication.

## 2024-12-03 ENCOUNTER — ROUTINE PRENATAL (OUTPATIENT)
Dept: OBSTETRICS AND GYNECOLOGY | Facility: CLINIC | Age: 39
End: 2024-12-03
Payer: COMMERCIAL

## 2024-12-03 VITALS — WEIGHT: 146.2 LBS | BODY MASS INDEX: 22.9 KG/M2 | DIASTOLIC BLOOD PRESSURE: 82 MMHG | SYSTOLIC BLOOD PRESSURE: 124 MMHG

## 2024-12-03 DIAGNOSIS — O99.280 HYPOTHYROIDISM IN PREGNANCY, ANTEPARTUM: Primary | ICD-10-CM

## 2024-12-03 DIAGNOSIS — O09.529 ANTEPARTUM MULTIGRAVIDA OF ADVANCED MATERNAL AGE: ICD-10-CM

## 2024-12-03 DIAGNOSIS — Z98.891 PREVIOUS CESAREAN SECTION: ICD-10-CM

## 2024-12-03 DIAGNOSIS — E03.9 HYPOTHYROIDISM IN PREGNANCY, ANTEPARTUM: Primary | ICD-10-CM

## 2024-12-03 NOTE — PROGRESS NOTES
OB FOLLOW UP  CC- Here for care of pregnancy        Denia Gage is a 39 y.o.  14w1d patient being seen today for her obstetrical follow up visit. Patient reports no complaints.     Her prenatal care is complicated by (and status) :   Patient Active Problem List   Diagnosis    Graves disease    History of headache    Eyelid retraction or lag    Lagophthalmos of eyelids of both eyes    Proptosis    Thyroid eye disease    Postoperative hypothyroidism    Pregnancy     labor in third trimester with  delivery    S/P  section    Antepartum multigravida of advanced maternal age    Previous  section       Genetic testing?: declines.  NOB labs reviewed  Ultrasound Today: No    ROS -   Patient Denies: leaking of fluid, vaginal bleeding, dysuria, excessive vomiting, and more than 6 contractions per hour  All other systems reviewed and are negative.     The additional following portions of the patient's history were reviewed and updated as appropriate: allergies, current medications, past family history, past medical history, past social history, past surgical history, and problem list.    I have reviewed and agree with the HPI, ROS, and historical information as entered above. Angel Moran MD          /82   Wt 66.3 kg (146 lb 3.2 oz)   LMP 2024   BMI 22.90 kg/m²         EXAM:     Prenatal Vitals  BP: 124/82  Weight: 66.3 kg (146 lb 3.2 oz)                      Assessment and Plan    Problem List Items Addressed This Visit       Antepartum multigravida of advanced maternal age    Previous  section     Other Visit Diagnoses       Hypothyroidism in pregnancy, antepartum    -  Primary    Relevant Orders    TSH    T4, Free            Pregnancy at 14w1d  Labs reviewed from New OB Visit.  Counseled on genetic testing, carrier status and option for NT screen  Activity and Exercise discussed.  Lab(s) Ordered  Patient is on Prenatal vitamins  Discussed  bASA for PIH prevention from 12 to 36wk  Return in about 4 weeks (around 12/31/2024) for Routine prenatal care.    Angel Moran MD  12/03/2024

## 2024-12-04 DIAGNOSIS — E89.0 POSTOPERATIVE HYPOTHYROIDISM: ICD-10-CM

## 2024-12-04 LAB
T4 FREE SERPL-MCNC: 1.15 NG/DL (ref 0.92–1.68)
TSH SERPL DL<=0.005 MIU/L-ACNC: 2.94 UIU/ML (ref 0.27–4.2)

## 2024-12-04 RX ORDER — LEVOTHYROXINE SODIUM 100 UG/1
100 TABLET ORAL DAILY
Qty: 30 TABLET | Refills: 5 | Status: SHIPPED | OUTPATIENT
Start: 2024-12-04

## 2024-12-31 ENCOUNTER — ROUTINE PRENATAL (OUTPATIENT)
Dept: OBSTETRICS AND GYNECOLOGY | Facility: CLINIC | Age: 39
End: 2024-12-31
Payer: COMMERCIAL

## 2024-12-31 VITALS — BODY MASS INDEX: 23.56 KG/M2 | SYSTOLIC BLOOD PRESSURE: 118 MMHG | DIASTOLIC BLOOD PRESSURE: 62 MMHG | WEIGHT: 150.4 LBS

## 2024-12-31 DIAGNOSIS — E05.00 GRAVES DISEASE: Chronic | ICD-10-CM

## 2024-12-31 DIAGNOSIS — Z98.891 PREVIOUS CESAREAN SECTION: ICD-10-CM

## 2024-12-31 DIAGNOSIS — Z34.92 PRENATAL CARE IN SECOND TRIMESTER: ICD-10-CM

## 2024-12-31 DIAGNOSIS — O09.529 ANTEPARTUM MULTIGRAVIDA OF ADVANCED MATERNAL AGE: Primary | ICD-10-CM

## 2024-12-31 DIAGNOSIS — Z36.89 ENCOUNTER FOR FETAL ANATOMIC SURVEY: ICD-10-CM

## 2024-12-31 PROBLEM — Z34.90 PREGNANCY: Status: RESOLVED | Noted: 2022-11-22 | Resolved: 2024-12-31

## 2024-12-31 RX ORDER — ASPIRIN 81 MG/1
81 TABLET, CHEWABLE ORAL DAILY
COMMUNITY

## 2024-12-31 NOTE — PROGRESS NOTES
OB FOLLOW UP  CC- Here for care of pregnancy        Denia Gage is a 39 y.o.  18w1d patient being seen today for her obstetrical follow up visit. Patient reports no complaints    Her prenatal care is complicated by (and status) : see below.  Patient Active Problem List   Diagnosis    Graves disease    History of headache    Eyelid retraction or lag    Lagophthalmos of eyelids of both eyes    Proptosis    Thyroid eye disease    Postoperative hypothyroidism    Antepartum multigravida of advanced maternal age    Previous  section       Flu Status: Already given in current flu season  Ultrasound Today: No    AFP: declines    ROS -   Patient Denies: leaking of fluid, vaginal bleeding, dysuria, excessive vomiting, and more than 6 contractions per hour  All other systems reviewed and are negative.       The additional following portions of the patient's history were reviewed and updated as appropriate: allergies and current medications.      I have reviewed and agree with the HPI, ROS, and historical information as entered above. Angel Moran MD          EXAM:     Prenatal Vitals  BP: 118/62  Weight: 68.2 kg (150 lb 6.4 oz)                         Assessment and Plan    Problem List Items Addressed This Visit       Graves disease (Chronic)    Overview     Was on methimazone and atenolol, followed with [facility].  - s/p thyroidectomy  - followed with ophthalmologist at [facility].          Relevant Medications    levothyroxine (SYNTHROID, LEVOTHROID) 100 MCG tablet    Other Relevant Orders    US Ob 14 + Weeks Single or First Gestation    TSH    T4, Free    Antepartum multigravida of advanced maternal age - Primary    Relevant Orders    POC Urinalysis Dipstick    US Ob 14 + Weeks Single or First Gestation    Previous  section    Relevant Orders    US Ob 14 + Weeks Single or First Gestation     Other Visit Diagnoses       Prenatal care in second trimester        Relevant  Orders    POC Urinalysis Dipstick    US Ob 14 + Weeks Single or First Gestation    Encounter for fetal anatomic survey        Relevant Orders    US Ob 14 + Weeks Single or First Gestation            Pregnancy at 18w1d  Fetal status reassuring.   Counseled on MSAFP alone in relation to OTD and placental issues.    Anatomy scan next visit.   Activity and Exercise discussed.  Lab(s) Ordered  U/S ordered at follow up  Patient is on Prenatal vitamins  Discussed bASA for PIH prevention from 12 to 36wk  Return in about 2 weeks (around 1/14/2025) for US for anatomic survery, Routine prenatal care.    Angel Moran MD  12/31/2024

## 2025-01-01 LAB
T4 FREE SERPL-MCNC: 1.3 NG/DL (ref 0.92–1.68)
TSH SERPL DL<=0.005 MIU/L-ACNC: 1.01 UIU/ML (ref 0.27–4.2)

## 2025-01-09 ENCOUNTER — OFFICE VISIT (OUTPATIENT)
Dept: ENDOCRINOLOGY | Facility: CLINIC | Age: 40
End: 2025-01-09
Payer: COMMERCIAL

## 2025-01-09 VITALS
HEART RATE: 80 BPM | DIASTOLIC BLOOD PRESSURE: 64 MMHG | BODY MASS INDEX: 23.23 KG/M2 | HEIGHT: 67 IN | SYSTOLIC BLOOD PRESSURE: 118 MMHG | OXYGEN SATURATION: 98 % | WEIGHT: 148 LBS

## 2025-01-09 DIAGNOSIS — E89.0 POSTOPERATIVE HYPOTHYROIDISM: Primary | ICD-10-CM

## 2025-01-09 PROCEDURE — 99213 OFFICE O/P EST LOW 20 MIN: CPT | Performed by: INTERNAL MEDICINE

## 2025-01-09 NOTE — PROGRESS NOTES
"Chief Complaint   Patient presents with    Thyroid Problem     Postoperative hypothyroidism          HPI   Denia Gage is a 39 y.o. female had concerns including Thyroid Problem (Postoperative hypothyroidism/).      On levothyroxine 100 mcg daily.  TFTs from 2024 in an optimal range with TSH down to 1.01, free T4 1.3. Dose was last increased a little over a month ago.      Energy and nausea are improving as she is progressing in her pregnancy.     Her last baby ended up with  graves disease.     The following portions of the patient's history were reviewed and updated as appropriate: allergies, current medications, past family history, past medical history, past social history, past surgical history, and problem list.    Review of Systems   Constitutional: Negative.    Endocrine: Negative.         /64 (BP Location: Right arm, Patient Position: Sitting, Cuff Size: Adult)   Pulse 80   Ht 170.2 cm (67\")   Wt 67.1 kg (148 lb)   LMP 2024   SpO2 98%   BMI 23.18 kg/m²      Physical Exam  Vitals reviewed.   Constitutional:       Appearance: Normal appearance.   Eyes:      Comments: Prominent, retracted lids   Cardiovascular:      Rate and Rhythm: Normal rate.   Pulmonary:      Effort: Pulmonary effort is normal.   Neurological:      General: No focal deficit present.      Mental Status: She is alert. Mental status is at baseline.   Psychiatric:         Mood and Affect: Mood normal.         Behavior: Behavior normal.            LABS AND IMAGING   CMP  Lab Results   Component Value Date    GLUCOSE 100 (H) 2023    BUN 18 2023    CREATININE 0.72 2023    EGFRIFNONA >60 2021    EGFRIFAFRI >60 2021    BCR 25.0 2023    K 4.0 2023    CO2 28.0 2023    CALCIUM 9.6 2023    ALBUMIN 4.8 2023    AST 30 2023    ALT 25 2023        CBC w/DIFF   Lab Results   Component Value Date    WBC 6.6 10/30/2024    RBC 4.73 10/30/2024    HGB " 13.7 10/30/2024    HCT 44.2 10/30/2024    MCV 93 10/30/2024    MCH 29.0 10/30/2024    MCHC 31.0 (L) 10/30/2024    RDW 13.0 10/30/2024    RDWSD 40.3 2023    MPV 8.8 2023     10/30/2024    NEUTRORELPCT 57 10/30/2024    LYMPHORELPCT 30 10/30/2024    MONORELPCT 10 10/30/2024    EOSRELPCT 1 10/30/2024    BASORELPCT 1 10/30/2024    AUTOIGPER 0.4 2023    NEUTROABS 3.9 10/30/2024    LYMPHSABS 2.0 10/30/2024    MONOSABS 0.6 10/30/2024    EOSABS 0.1 10/30/2024    BASOSABS 0.0 10/30/2024    AUTOIGNUM 0.05 2023    NRBC 0.0 2023     TSH  Lab Results   Component Value Date    TSH 1.010 2024    TSH 2.940 2024    TSH 3.030 10/30/2024     T4  Lab Results   Component Value Date    FREET4 1.30 2024    FREET4 1.15 2024    FREET4 1.33 10/30/2024     Lab Results   Component Value Date    L9LAFEC 16.20 (H) 2022    J9ASHXS 14.30 (H) 08/15/2022    G5DSYSS 10.30 2022     T3  Lab Results   Component Value Date    T3FREE >32.55 (H) 2019       Assessment and Plan    Diagnoses and all orders for this visit:    1. Postoperative hypothyroidism (Primary)  S/p thyroidectomy 2021 for Graves disease with proptosis.  Clinically euthyroid on levothyroxine 100 mcg daily.   Prepregnancy dose was down to 75 mcg.   Labs again in a month.   Due to last baby developing  Graves disease, check antibody levels.   -     T4, Free; Future  -     TSH; Future  -     Thyrotropin Receptor Antibody; Future  -     Thyroid Stimulating Immunoglobulin; Future         Return in about 3 months (around 2025) for next scheduled follow up. The patient was instructed to contact the clinic with any interval questions or concerns.    Electronically signed by: Florina Hull DO   Endocrinologist    Please note that portions of this note were completed with a voice recognition program.

## 2025-01-15 ENCOUNTER — ROUTINE PRENATAL (OUTPATIENT)
Dept: OBSTETRICS AND GYNECOLOGY | Facility: CLINIC | Age: 40
End: 2025-01-15
Payer: COMMERCIAL

## 2025-01-15 VITALS — SYSTOLIC BLOOD PRESSURE: 110 MMHG | BODY MASS INDEX: 23.43 KG/M2 | WEIGHT: 149.6 LBS | DIASTOLIC BLOOD PRESSURE: 60 MMHG

## 2025-01-15 DIAGNOSIS — Z34.92 SECOND TRIMESTER PREGNANCY: Primary | ICD-10-CM

## 2025-01-15 DIAGNOSIS — O09.529 ANTEPARTUM MULTIGRAVIDA OF ADVANCED MATERNAL AGE: ICD-10-CM

## 2025-01-15 DIAGNOSIS — E05.00 GRAVES DISEASE: Chronic | ICD-10-CM

## 2025-01-15 LAB
GLUCOSE UR STRIP-MCNC: NEGATIVE MG/DL
PROT UR STRIP-MCNC: NEGATIVE MG/DL

## 2025-01-15 NOTE — PROGRESS NOTES
OB FOLLOW UP  CC- Here for care of pregnancy        Denia Gage is a 39 y.o.  20w2d patient being seen today for her obstetrical follow up visit. Patient reports no complaints.     Her prenatal care is complicated by (and status) : see below.  Patient Active Problem List   Diagnosis    Graves disease    History of headache    Eyelid retraction or lag    Lagophthalmos of eyelids of both eyes    Proptosis    Thyroid eye disease    Postoperative hypothyroidism    Antepartum multigravida of advanced maternal age    Previous  section       Flu Status: Already given in current flu season  Ultrasound Today: Yes  AFP was declined.    ROS -     Patient Denies: leaking of fluid, vaginal bleeding, dysuria, excessive vomiting, and more than 6 contractions per hour  Fetal Movement : Yes  All other systems reviewed and are negative.       The additional following portions of the patient's history were reviewed and updated as appropriate: allergies and current medications.      I have reviewed and agree with the HPI, ROS, and historical information as entered above. Angel Moran MD      /60   Wt 67.9 kg (149 lb 9.6 oz)   LMP 2024   BMI 23.43 kg/m²       EXAM:     Prenatal Vitals  BP: 110/60  Weight: 67.9 kg (149 lb 9.6 oz)   Fetal Heart Rate: 146          Urine Glucose Read-only: Negative  Urine Protein Read-only: Negative       Assessment and Plan    Problem List Items Addressed This Visit       Graves disease (Chronic)    Overview     Was on methimazone and atenolol, followed with [facility].  - s/p thyroidectomy  - followed with ophthalmologist at [facility].          Relevant Medications    levothyroxine (SYNTHROID, LEVOTHROID) 100 MCG tablet    Antepartum multigravida of advanced maternal age     Other Visit Diagnoses       Second trimester pregnancy    -  Primary    Relevant Orders    POC Urinalysis Dipstick (Completed)    US Ob Follow Up Transabdominal Approach             Pregnancy at 20w2d  Anatomy scan today is incomplete, follow up in 4 weeks for additional views. Anatomy that was visualized was within normal limits.  Fetal status reassuring.   Activity and Exercise discussed.  Patient is on Prenatal vitamins  Discussed bASA for PIH prevention from 12 to 36wk  Return in about 4 weeks (around 2/12/2025) for Routine prenatal care.      Angel Moran MD  01/15/2025

## 2025-01-30 ENCOUNTER — TELEPHONE (OUTPATIENT)
Dept: OBSTETRICS AND GYNECOLOGY | Facility: CLINIC | Age: 40
End: 2025-01-30

## 2025-01-30 NOTE — TELEPHONE ENCOUNTER
Caller: Denia Gage    Relationship to patient: Self    Best call back number: [phone]    Chief complaint: NEEDS APPT LATER IN THE DAY     Type of visit: U/S AND OB FOLLOWUP     Requested date: 2/12/25 AFTER 3      If rescheduling, when is the original appointment: 2/12/25

## 2025-02-12 ENCOUNTER — ROUTINE PRENATAL (OUTPATIENT)
Dept: OBSTETRICS AND GYNECOLOGY | Facility: CLINIC | Age: 40
End: 2025-02-12
Payer: COMMERCIAL

## 2025-02-12 VITALS — BODY MASS INDEX: 23.96 KG/M2 | DIASTOLIC BLOOD PRESSURE: 62 MMHG | WEIGHT: 153 LBS | SYSTOLIC BLOOD PRESSURE: 112 MMHG

## 2025-02-12 DIAGNOSIS — Z34.92 PRENATAL CARE IN SECOND TRIMESTER: ICD-10-CM

## 2025-02-12 DIAGNOSIS — O09.529 ANTEPARTUM MULTIGRAVIDA OF ADVANCED MATERNAL AGE: ICD-10-CM

## 2025-02-12 DIAGNOSIS — E05.00 GRAVES DISEASE: Chronic | ICD-10-CM

## 2025-02-12 DIAGNOSIS — Z98.891 PREVIOUS CESAREAN SECTION: ICD-10-CM

## 2025-02-12 LAB
GLUCOSE UR STRIP-MCNC: NEGATIVE MG/DL
PROT UR STRIP-MCNC: NEGATIVE MG/DL

## 2025-02-12 NOTE — PROGRESS NOTES
OB FOLLOW UP  CC- Here for care of pregnancy        Denia Gage is a 39 y.o.  24w2d patient being seen today for her obstetrical follow up visit. Patient reports no complaints.     Her prenatal care is complicated by (and status) : see below.  Patient Active Problem List   Diagnosis    Graves disease    History of headache    Eyelid retraction or lag    Lagophthalmos of eyelids of both eyes    Proptosis    Thyroid eye disease    Postoperative hypothyroidism    Antepartum multigravida of advanced maternal age    Previous  section       Flu Status: Already given in current flu season  Ultrasound Today: Yes  Reviewed 1 hr glucose testing and TDAP next visit.    ROS -   Patient Denies: leaking of fluid, vaginal bleeding, dysuria, excessive vomiting, and more than 6 contractions per hour  Fetal Movement : normal  Other than what is documented in the HPI, all other systems reviewed and are negative.       The additional following portions of the patient's history were reviewed and updated as appropriate: allergies, current medications, and problem list.      I have reviewed and agree with the HPI, ROS, and historical information as entered above.   Jerald Cao MD      /62 Comment: R Arm  Wt 69.4 kg (153 lb)   LMP 2024   BMI 23.96 kg/m²       EXAM:     Prenatal Vitals  BP: 112/62 (R Arm)  Weight: 69.4 kg (153 lb)   Fetal Heart Rate: 148 (US)               Urine Glucose Read-only: Negative  Urine Protein Read-only: Negative       Assessment and Plan    Problem List Items Addressed This Visit       Graves disease (Chronic)    Overview     Was on methimazone and atenolol, followed with [facility].  - s/p thyroidectomy  - followed with ophthalmologist at [facility].          Relevant Medications    levothyroxine (SYNTHROID, LEVOTHROID) 100 MCG tablet    Other Relevant Orders    T3, Free    CBC (No Diff)    Iron Profile    T4, Free    TSH    Thyroid Stimulating  Immunoglobulin    Thyrotropin Receptor Antibody    Antepartum multigravida of advanced maternal age    Relevant Orders    POC Urinalysis Dipstick (Completed)    Previous  section     Other Visit Diagnoses       Prenatal care in second trimester        Relevant Orders    POC Urinalysis Dipstick (Completed)    T3, Free    CBC (No Diff)    Iron Profile            Pregnancy at 24w2d  Fetal status reassuring.  Normal growth on ultrasound today.  Strict Graves' disease.  Endocrinologist request thyroid labs today including Thyroid standing stimulating immunoglobulin and thyrotropin receptor antibody.  History of prior .  Plans repeat .  anatomy scan completed today and within normal limits.  1 hour gtt, CBC, Antibody screen, TDAP, and RPR next visit. Instructions given  Discussed/encouraged TDAP vaccination after 28 weeks  Activity and Exercise discussed.  Return in about 4 weeks (around 3/12/2025) for One hour Glucose Screen.      Jerald Cao MD  2025

## 2025-02-13 LAB
ERYTHROCYTE [DISTWIDTH] IN BLOOD BY AUTOMATED COUNT: 12.6 % (ref 11.7–15.4)
HCT VFR BLD AUTO: 38.3 % (ref 34–46.6)
HGB BLD-MCNC: 12.7 G/DL (ref 11.1–15.9)
IRON SATN MFR SERPL: 15 % (ref 15–55)
IRON SERPL-MCNC: 70 UG/DL (ref 27–159)
MCH RBC QN AUTO: 30.3 PG (ref 26.6–33)
MCHC RBC AUTO-ENTMCNC: 33.2 G/DL (ref 31.5–35.7)
MCV RBC AUTO: 91 FL (ref 79–97)
PLATELET # BLD AUTO: 357 X10E3/UL (ref 150–450)
RBC # BLD AUTO: 4.19 X10E6/UL (ref 3.77–5.28)
T3FREE SERPL-MCNC: 2.6 PG/ML (ref 2–4.4)
T4 FREE SERPL-MCNC: 1.23 NG/DL (ref 0.82–1.77)
TIBC SERPL-MCNC: 473 UG/DL (ref 250–450)
TSH SERPL DL<=0.005 MIU/L-ACNC: 1 UIU/ML (ref 0.45–4.5)
UIBC SERPL-MCNC: 403 UG/DL (ref 131–425)
WBC # BLD AUTO: 6.7 X10E3/UL (ref 3.4–10.8)

## 2025-02-15 LAB
TSH RECEP AB SER-ACNC: 28.6 IU/L (ref 0–1.75)
TSI SER-ACNC: 58.2 IU/L (ref 0–0.55)

## 2025-03-12 ENCOUNTER — ROUTINE PRENATAL (OUTPATIENT)
Dept: OBSTETRICS AND GYNECOLOGY | Facility: CLINIC | Age: 40
End: 2025-03-12
Payer: COMMERCIAL

## 2025-03-12 VITALS — SYSTOLIC BLOOD PRESSURE: 122 MMHG | DIASTOLIC BLOOD PRESSURE: 62 MMHG | WEIGHT: 156.8 LBS | BODY MASS INDEX: 24.56 KG/M2

## 2025-03-12 DIAGNOSIS — Z34.90 PREGNANCY, UNSPECIFIED GESTATIONAL AGE: ICD-10-CM

## 2025-03-12 DIAGNOSIS — E89.0 POSTOPERATIVE HYPOTHYROIDISM: ICD-10-CM

## 2025-03-12 DIAGNOSIS — Z3A.28 28 WEEKS GESTATION OF PREGNANCY: ICD-10-CM

## 2025-03-12 DIAGNOSIS — Z34.93 PRENATAL CARE, THIRD TRIMESTER: ICD-10-CM

## 2025-03-12 DIAGNOSIS — E05.00 GRAVES DISEASE: Primary | Chronic | ICD-10-CM

## 2025-03-12 DIAGNOSIS — Z98.891 PREVIOUS CESAREAN SECTION: ICD-10-CM

## 2025-03-12 DIAGNOSIS — O09.529 ANTEPARTUM MULTIGRAVIDA OF ADVANCED MATERNAL AGE: ICD-10-CM

## 2025-03-12 DIAGNOSIS — Z23 NEED FOR TDAP VACCINATION: ICD-10-CM

## 2025-03-12 LAB
EXPIRATION DATE: 0
GLUCOSE UR STRIP-MCNC: NEGATIVE MG/DL
Lab: 0
PROT UR STRIP-MCNC: NEGATIVE MG/DL

## 2025-03-12 NOTE — PROGRESS NOTES
OB FOLLOW UP  CC- Here for care of pregnancy        Denia Gage is a 39 y.o.  28w2d patient being seen today for her obstetrical follow up. Patient reports occasional mild heartburn (resolves on its own) and intermittent mild BLE edema.     Patient undergoing Glucola testing today. She is due for her testing at 1050.       MBT: A+  Rhogam: is not indicated.  28 week packet: reviewed with patient   TDAP: given today  Flu Status: Already given in current flu season  Ultrasound Today: No    Her prenatal care is complicated by (and status): see below.  Patient Active Problem List   Diagnosis    Graves disease    History of headache    Eyelid retraction or lag    Lagophthalmos of eyelids of both eyes    Proptosis    Thyroid eye disease    Postoperative hypothyroidism    Antepartum multigravida of advanced maternal age    Previous  section    Pregnancy         ROS -   Patient Denies: Loss of Fluid, Vaginal Spotting, Vision Changes, Headaches, Nausea , Vomiting , Contractions, and skin itching  Fetal Movement: normal  Other than what is documented in the HPI, all other systems reviewed and are negative.     The additional following portions of the patient's history were reviewed and updated as appropriate: allergies, current medications, past family history, past medical history, past social history, past surgical history, and problem list.    I have reviewed and agree with the HPI, ROS, and historical information as entered above.   Jerald Cao MD      /62   Wt 71.1 kg (156 lb 12.8 oz)   LMP 2024   BMI 24.56 kg/m²         EXAM:     Prenatal Vitals  BP: 122/62  Weight: 71.1 kg (156 lb 12.8 oz)   Fetal Heart Rate: 155      Fundal Height (cm): 27 cm                  Assessment and Plan    Problem List Items Addressed This Visit       Graves disease - Primary (Chronic)    Overview   Was on methimazone and atenolol, followed with [facility].  - s/p thyroidectomy  -  followed with ophthalmologist at [facility].          Relevant Medications    levothyroxine (SYNTHROID, LEVOTHROID) 100 MCG tablet    Other Relevant Orders    TSH    US Ob Follow Up Transabdominal Approach    Antepartum multigravida of advanced maternal age    Postoperative hypothyroidism    Relevant Medications    levothyroxine (SYNTHROID, LEVOTHROID) 100 MCG tablet    Other Relevant Orders    TSH    Pregnancy    Overview   H/o thyroidectomy s/p Grave's Disease  39-year-old - h/o P C/S at 36w2d in  for FI  Prior  at 36 weeks for fetal intolerance  Already received flu vaccine  Needs PP pap smear  Declines genetic testing         Relevant Orders    POC Glucose, Urine, Qualitative, Dipstick    POC Protein, Urine, Qualitative, Dipstick    RPR Qualitative with Reflex to Quant    CBC (No Diff)    Gestational Screen 1 Hr ([facility])    Antibody Screen    TSH    Tdap Vaccine => 8yo IM (BOOSTRIX/ADACEL) (Completed)    US Ob Follow Up Transabdominal Approach    Previous  section     Other Visit Diagnoses         Prenatal care, third trimester        Relevant Orders    POC Glucose, Urine, Qualitative, Dipstick    POC Protein, Urine, Qualitative, Dipstick    RPR Qualitative with Reflex to Quant    CBC (No Diff)    Gestational Screen 1 Hr ([facility])    Antibody Screen    TSH    Tdap Vaccine => 8yo IM (BOOSTRIX/ADACEL) (Completed)      Need for Tdap vaccination        Relevant Orders    Tdap Vaccine => 8yo IM (BOOSTRIX/ADACEL) (Completed)            Pregnancy at 28w2d; 1 hour glucose screen today.  History of Graves' disease.  Recent TFTs were stable.  Followed by endocrinology.  Growth ultrasound next visit.  1 hr Glucola, CBC, RPR. Antibody screen and TDAP today  Fetal movement/PTL or Labor precautions  U/S ordered at follow up  Activity and Exercise discussed.  Return in about 4 weeks (around 2025) for GROWTH U/S.        Jerald Cao MD  2025

## 2025-03-13 LAB
BLD GP AB SCN SERPL QL: NEGATIVE
ERYTHROCYTE [DISTWIDTH] IN BLOOD BY AUTOMATED COUNT: 12.7 % (ref 12.3–15.4)
GLUCOSE 1H P 50 G GLC PO SERPL-MCNC: 77 MG/DL (ref 65–139)
HCT VFR BLD AUTO: 35.4 % (ref 34–46.6)
HGB BLD-MCNC: 11.9 G/DL (ref 12–15.9)
MCH RBC QN AUTO: 30.9 PG (ref 26.6–33)
MCHC RBC AUTO-ENTMCNC: 33.6 G/DL (ref 31.5–35.7)
MCV RBC AUTO: 91.9 FL (ref 79–97)
PLATELET # BLD AUTO: 317 10*3/MM3 (ref 140–450)
RBC # BLD AUTO: 3.85 10*6/MM3 (ref 3.77–5.28)
RPR SER QL: NON REACTIVE
TSH SERPL DL<=0.005 MIU/L-ACNC: 0.83 UIU/ML (ref 0.27–4.2)
WBC # BLD AUTO: 5.86 10*3/MM3 (ref 3.4–10.8)

## 2025-04-09 ENCOUNTER — ROUTINE PRENATAL (OUTPATIENT)
Dept: OBSTETRICS AND GYNECOLOGY | Facility: CLINIC | Age: 40
End: 2025-04-09
Payer: COMMERCIAL

## 2025-04-09 VITALS — BODY MASS INDEX: 24.53 KG/M2 | WEIGHT: 156.6 LBS | SYSTOLIC BLOOD PRESSURE: 116 MMHG | DIASTOLIC BLOOD PRESSURE: 68 MMHG

## 2025-04-09 DIAGNOSIS — E05.00 GRAVES DISEASE: Chronic | ICD-10-CM

## 2025-04-09 DIAGNOSIS — E89.0 POSTOPERATIVE HYPOTHYROIDISM: ICD-10-CM

## 2025-04-09 DIAGNOSIS — Z34.93 PRENATAL CARE IN THIRD TRIMESTER, UNSPECIFIED GRAVIDITY: ICD-10-CM

## 2025-04-09 DIAGNOSIS — Z3A.32 32 WEEKS GESTATION OF PREGNANCY: Primary | ICD-10-CM

## 2025-04-09 DIAGNOSIS — O09.529 ANTEPARTUM MULTIGRAVIDA OF ADVANCED MATERNAL AGE: ICD-10-CM

## 2025-04-09 DIAGNOSIS — Z98.891 PREVIOUS CESAREAN SECTION: ICD-10-CM

## 2025-04-09 LAB
GLUCOSE UR STRIP-MCNC: NEGATIVE MG/DL
PROT UR STRIP-MCNC: NEGATIVE MG/DL

## 2025-04-09 NOTE — PATIENT INSTRUCTIONS
Prenatal Care  Prenatal care is health care you get when pregnant. It helps you and your unborn baby stay as healthy as possible. Start prenatal care early in your pregnancy and continue to go to visits during your pregnancy.  Prenatal care may be given by a midwife, a family practice doctor, a nurse practitioner, physician assistant, or a childbirth and pregnancy doctor.  What are the benefits of prenatal care?  In prenatal care, your health care provider will get to know your medical history. You'll be checked for conditions that might affect you and your baby. Prenatal care will:  Lower the risk for problems as your child grows.  Lower certain risks for your baby, especially the risk that:  Your child may be born early.  Your child will have a low weight at birth.  What can I expect at the first prenatal care visit?  Your first visit will likely be the longest. You should ask to be seen as soon as you know you're pregnant. The first visit is a good time to talk about any questions or concerns. Make a list of questions to ask your provider at your visits.  Medical history  At your visit, you and your provider will talk about your medical history, including:  Your family's medical history and the medical history of the baby's father.  Any past pregnancies and long-term (chronic) health conditions.  Any surgeries or procedures you have had.  All medicines you're taking. Tell them if you're taking herbs or supplements too.  Any tobacco, alcohol, or drug use.  Other problems that may harm you and your baby. Tell them if:  You need food or housing.  You have been around chemicals or radiation.  Your partner yells at you, hits you, or hurts you.  Tests and screenings  Your provider will:  Do a physical exam, including a pelvic and breast exam.  Do tests to check for:  Urinary tract infection (UTI).  Sexually transmitted infections (STIs).  Low iron levels in your blood. This is called anemia.  Blood type and certain  proteins on red blood cells called Rh antibodies.  Infections and immunity to viruses, such as hepatitis B and rubella.  HIV.  Ask your provider if you need to be checked for genetic diseases.  Tips about staying healthy  Your provider will also give you information about how to keep yourself and your baby healthy, including:  Nutrition, vitamins, and food safety.  Physical activity.  How to treat some problems, such as morning sickness.  How to avoid infections and substances that may harm your baby.  Caring for your teeth.  Work and travel.  Problems that require you to call your provider.  How often will I have prenatal care visits?  After your first prenatal care visit, you will have regular visits throughout your pregnancy. You may visit your provider as follows:  Up to week 28 of pregnancy: once every 4 weeks.  28-36 weeks: once every 2 weeks.  After 36 weeks: every week until delivery.  Some people may have more visits. Others may have fewer. It all depends on your health and that of your baby.  Keep all prenatal visits. This is one way for you and your baby to stay as healthy as possible.  What happens during routine prenatal care visits?  Your provider will:  Check your weight and blood pressure.  Check your baby's heart sounds.  Ask questions about your diet, exercise, sleeping patterns, and whether you can feel the baby move.  Ask about any pregnancy symptoms you're having and how you're dealing with them. Tell your provider if:  You throw up or feel like you may throw up.  You have discharge or you bleed from your vagina.  You have trouble pooping (constipation).  You have swelling, headaches, or trouble seeing.  You are very tired, or you feel sad and anxious all the time.  You have discomfort, including back pain or pain in the pelvis.  Tell you problems to watch for during your pregnancy, including signs of labor.  Measure the height of your uterus in your belly. This is called fundal height.  What  tests might I have during prenatal care visits?  You may have blood, urine, and imaging tests. These may include:  Urine tests to check for blood sugar, protein, or signs of infection.  Genetic testing.  Ultrasounds to check your baby's growth, development, and well-being. Your baby may also be checked for congenital conditions.  Glucose tests to check for gestational diabetes. This is a form of diabetes that a person can get when pregnant.  A test to check for group B strep (GBS) infection.  What else can I expect during prenatal care visits?  Your provider may give you some vaccines. Getting certain vaccines during pregnancy can protect your baby after birth. These may include:  A flu shot.  Tdap (tetanus, diphtheria, pertussis) vaccine.  A COVID-19 vaccine.  A RSV vaccine.  Later in your pregnancy, your provider may talk to you about:  Childbirth and childbirth classes.  Breastfeeding and breastfeeding classes.  Birth control after your baby is born.  Where to find more information  Office on Women's Health: womenshealth.gov  American Pregnancy Association: americanpregnancy.org  March of Dimes: marchofdimes.org  This information is not intended to replace advice given to you by your health care provider. Make sure you discuss any questions you have with your health care provider.  Document Revised: 04/22/2024 Document Reviewed: 04/22/2024  Elsevier Patient Education © 2024 Elsevier Inc.+

## 2025-04-09 NOTE — PROGRESS NOTES
OB FOLLOW UP  CC- Here for care of pregnancy        Denia Gage is a 39 y.o.  32w2d patient being seen today for her obstetrical follow up visit. Patient reports no complaints.     Her prenatal care is complicated by (and status) :  see below.  Patient Active Problem List   Diagnosis    Graves disease    History of headache    Eyelid retraction or lag    Lagophthalmos of eyelids of both eyes    Proptosis    Thyroid eye disease    Postoperative hypothyroidism    Antepartum multigravida of advanced maternal age    Previous  section    Pregnancy       TDAP status: received at last visit  Rhogam status: was not indicated  28 week labs: Reviewed and normal  Ultrasound Today: Yes  Non Stress Test: No.      ROS -   Patient Denies: Loss of Fluid, Vaginal Spotting, Vision Changes, Headaches, Contractions, Epigastric pain, and skin itching  Fetal Movement : normal  Other than what is documented in the HPI, all other systems reviewed and are negative.     The additional following portions of the patient's history were reviewed and updated as appropriate: allergies, current medications, past family history, past medical history, past social history, past surgical history, and problem list.    I have reviewed and agree with the HPI, ROS, and historical information as entered above. Angel Moran MD      /68   Wt 71 kg (156 lb 9.6 oz)   LMP 2024   BMI 24.53 kg/m²         EXAM:     Prenatal Vitals  BP: 116/68  Weight: 71 kg (156 lb 9.6 oz)   Fetal Heart Rate: 148               Urine Glucose Read-only: Negative  Urine Protein Read-only: Negative           Assessment and Plan    Problem List Items Addressed This Visit       Graves disease (Chronic)    Overview   Was on methimazone and atenolol, followed with [facility].  - s/p thyroidectomy  - followed with ophthalmologist at [facility].          Relevant Medications    levothyroxine (SYNTHROID, LEVOTHROID) 100 MCG tablet     Postoperative hypothyroidism    Relevant Medications    levothyroxine (SYNTHROID, LEVOTHROID) 100 MCG tablet    Antepartum multigravida of advanced maternal age    Previous  section    Pregnancy - Primary    Overview   H/o thyroidectomy s/p Grave's Disease  39-year-old - h/o P C/S at 36w2d in  for FI  Prior  at 36 weeks for fetal intolerance  Already received flu vaccine  Needs PP pap smear  Declines genetic testing          Other Visit Diagnoses         Prenatal care in third trimester, unspecified         Relevant Orders    POC Urinalysis Dipstick (Completed)            Pregnancy at 32w2d  Fetal status reassuring.  28 week labs reviewed.    Activity and Exercise discussed.  Fetal movement/PTL or Labor precautions  Patient is on Prenatal vitamins  Reviewed Pre-eclampsia signs/symptoms  Discussed bASA for PIH prevention from 12 to 36wk  Return in about 2 weeks (around 2025) for Routine prenatal care.    Angel Moran MD  2025

## 2025-04-11 ENCOUNTER — LAB (OUTPATIENT)
Dept: LAB | Facility: HOSPITAL | Age: 40
End: 2025-04-11
Payer: COMMERCIAL

## 2025-04-11 DIAGNOSIS — E05.00 GRAVES DISEASE: Primary | Chronic | ICD-10-CM

## 2025-04-11 DIAGNOSIS — E05.00 GRAVES DISEASE: Chronic | ICD-10-CM

## 2025-04-11 LAB
T4 FREE SERPL-MCNC: 1.28 NG/DL (ref 0.92–1.68)
TSH SERPL DL<=0.05 MIU/L-ACNC: 0.59 UIU/ML (ref 0.27–4.2)

## 2025-04-11 PROCEDURE — 84439 ASSAY OF FREE THYROXINE: CPT

## 2025-04-11 PROCEDURE — 84443 ASSAY THYROID STIM HORMONE: CPT

## 2025-04-15 ENCOUNTER — OFFICE VISIT (OUTPATIENT)
Dept: ENDOCRINOLOGY | Facility: CLINIC | Age: 40
End: 2025-04-15
Payer: COMMERCIAL

## 2025-04-15 VITALS
HEIGHT: 67 IN | SYSTOLIC BLOOD PRESSURE: 100 MMHG | DIASTOLIC BLOOD PRESSURE: 52 MMHG | HEART RATE: 85 BPM | OXYGEN SATURATION: 98 % | BODY MASS INDEX: 24.64 KG/M2 | WEIGHT: 157 LBS

## 2025-04-15 DIAGNOSIS — E89.0 POSTOPERATIVE HYPOTHYROIDISM: Primary | ICD-10-CM

## 2025-04-15 DIAGNOSIS — E05.00 GRAVES DISEASE: ICD-10-CM

## 2025-04-15 PROCEDURE — 99213 OFFICE O/P EST LOW 20 MIN: CPT | Performed by: INTERNAL MEDICINE

## 2025-04-15 RX ORDER — LEVOTHYROXINE SODIUM 75 UG/1
75 TABLET ORAL DAILY
Qty: 30 TABLET | Refills: 3 | Status: SHIPPED | OUTPATIENT
Start: 2025-05-05 | End: 2026-05-05

## 2025-04-15 NOTE — PROGRESS NOTES
"Chief Complaint   Patient presents with    Hypothyroidism     Postoperative hypothyroidism        HPI   Denia Gage is a 39 y.o. female had concerns including Hypothyroidism (Postoperative hypothyroidism).      33.1 weeks pregnant.  On levothyroxine 100 mcg daily and recent TFTs are in an optimal range.    The following portions of the patient's history were reviewed and updated as appropriate: allergies, current medications, past family history, past medical history, past social history, past surgical history, and problem list.    Review of Systems   Constitutional: Negative.    Endocrine: Negative.         /52 (BP Location: Left arm, Patient Position: Sitting, Cuff Size: Adult)   Pulse 85   Ht 170.2 cm (67\")   Wt 71.2 kg (157 lb)   LMP 08/26/2024   SpO2 98%   BMI 24.59 kg/m²      Physical Exam  Vitals reviewed.   Constitutional:       Appearance: Normal appearance.   Cardiovascular:      Rate and Rhythm: Normal rate.   Pulmonary:      Effort: Pulmonary effort is normal.   Neurological:      General: No focal deficit present.      Mental Status: She is alert. Mental status is at baseline.   Psychiatric:         Mood and Affect: Mood normal.         Behavior: Behavior normal.              LABS AND IMAGING   CMP  Lab Results   Component Value Date    GLUCOSE 100 (H) 04/19/2023    BUN 18 04/19/2023    CREATININE 0.72 04/19/2023    EGFRIFNONA >60 06/24/2021    EGFRIFAFRI >60 06/24/2021    BCR 25.0 04/19/2023    K 4.0 04/19/2023    CO2 28.0 04/19/2023    CALCIUM 9.6 04/19/2023    ALBUMIN 4.8 05/12/2023    AST 30 05/12/2023    ALT 25 05/12/2023        CBC w/DIFF   Lab Results   Component Value Date    WBC 5.86 03/12/2025    RBC 3.85 03/12/2025    HGB 11.9 (L) 03/12/2025    HCT 35.4 03/12/2025    MCV 91.9 03/12/2025    MCH 30.9 03/12/2025    MCHC 33.6 03/12/2025    RDW 12.7 03/12/2025    RDWSD 40.3 04/19/2023    MPV 8.8 04/19/2023     03/12/2025    NEUTRORELPCT 57 10/30/2024    LYMPHORELPCT 30 " 10/30/2024    MONORELPCT 10 10/30/2024    EOSRELPCT 1 10/30/2024    BASORELPCT 1 10/30/2024    AUTOIGPER 0.4 2023    NEUTROABS 3.9 10/30/2024    LYMPHSABS 2.0 10/30/2024    MONOSABS 0.6 10/30/2024    EOSABS 0.1 10/30/2024    BASOSABS 0.0 10/30/2024    AUTOIGNUM 0.05 2023    NRBC 0.0 2023     TSH  Lab Results   Component Value Date    TSH 0.587 2025    TSH 0.835 2025    TSH 1.000 2025     T4  Lab Results   Component Value Date    FREET4 1.28 2025    FREET4 1.23 2025    FREET4 1.30 2024     Lab Results   Component Value Date    S5NEYEN 16.20 (H) 2022    C6JBOTW 14.30 (H) 08/15/2022    X6PFVSJ 10.30 2022     T3  Lab Results   Component Value Date    T3FREE 2.6 2025    T3FREE >32.55 (H) 2019     TRAb  Lab Results   Component Value Date    TSURCPAB 28.60 (H) 2025     Assessment and Plan    Diagnoses and all orders for this visit:    1. Postoperative hypothyroidism (Primary)  -     levothyroxine (Synthroid) 75 MCG tablet; Take 1 tablet by mouth Daily. Start this dose after delivery  Dispense: 30 tablet; Refill: 3  -     T4, Free; Standing  -     TSH; Standing    2. Graves disease    S/p thyroidectomy 2021 for Graves disease with proptosis.  Clinically euthyroid on levothyroxine 100 mcg daily.   Prepregnancy dose was down to 75 mcg.   Can recheck labs before delivery if she goes to the near term but otherwise can recheck after delivery.  Continue 100 mcg daily for now but after delivery change to 75 mcg dose and check labs in  6 to 8 weeks.  Baby is at risk to develop  Graves disease due to high antibody levels.  Advised up to 60 mcg of selenium and patient can reach out to [facility] endocrinologist for additional prophylactic recommendations.    Return in about 4 months (around 8/15/2025) for next scheduled follow up. The patient was instructed to contact the clinic with any interval questions or concerns.    Electronically signed  by: Florina Hull, DO   Endocrinologist    Please note that portions of this note were completed with a voice recognition program.

## 2025-04-23 ENCOUNTER — ROUTINE PRENATAL (OUTPATIENT)
Dept: OBSTETRICS AND GYNECOLOGY | Facility: CLINIC | Age: 40
End: 2025-04-23
Payer: COMMERCIAL

## 2025-04-23 VITALS — BODY MASS INDEX: 24.93 KG/M2 | SYSTOLIC BLOOD PRESSURE: 120 MMHG | DIASTOLIC BLOOD PRESSURE: 78 MMHG | WEIGHT: 159.2 LBS

## 2025-04-23 DIAGNOSIS — Z98.891 PREVIOUS CESAREAN SECTION: ICD-10-CM

## 2025-04-23 DIAGNOSIS — E05.00 GRAVES DISEASE: Chronic | ICD-10-CM

## 2025-04-23 DIAGNOSIS — O09.529 ANTEPARTUM MULTIGRAVIDA OF ADVANCED MATERNAL AGE: ICD-10-CM

## 2025-04-23 DIAGNOSIS — Z3A.34 34 WEEKS GESTATION OF PREGNANCY: Primary | ICD-10-CM

## 2025-04-23 LAB
GLUCOSE UR STRIP-MCNC: NEGATIVE MG/DL
PROT UR STRIP-MCNC: NEGATIVE MG/DL

## 2025-04-23 PROCEDURE — 0502F SUBSEQUENT PRENATAL CARE: CPT | Performed by: OBSTETRICS & GYNECOLOGY

## 2025-04-23 NOTE — PROGRESS NOTES
OB FOLLOW UP  CC- Here for care of pregnancy        Denia Gage is a 39 y.o.  34w2d patient being seen today for her obstetrical follow up visit. Patient reports no complaints.     Her prenatal care is complicated by (and status) : see below.  Patient Active Problem List   Diagnosis    Graves disease    History of headache    Eyelid retraction or lag    Lagophthalmos of eyelids of both eyes    Proptosis    Thyroid eye disease    Postoperative hypothyroidism    Antepartum multigravida of advanced maternal age    Previous  section    Pregnancy       Ultrasound Today: No  Non Stress Test: No.    ROS -   Patient Denies: Loss of Fluid, Vaginal Spotting, Vision Changes, Headaches, Nausea , Vomiting , Contractions, Epigastric pain, and skin itching  Fetal Movement : normal  Other than what is documented in the HPI, all other systems reviewed and are negative.       The additional following portions of the patient's history were reviewed and updated as appropriate: allergies, current medications, past family history, past medical history, past social history, past surgical history, and problem list.    I have reviewed and agree with the HPI, ROS, and historical information as entered above. Angel Moran MD      /78   Wt 72.2 kg (159 lb 3.2 oz)   LMP 2024   BMI 24.93 kg/m²       EXAM:     Prenatal Vitals  BP: 120/78  Weight: 72.2 kg (159 lb 3.2 oz)                   Urine Glucose Read-only: Negative  Urine Protein Read-only: Negative           Assessment and Plan    Problem List Items Addressed This Visit       Graves disease (Chronic)    Overview   Was on methimazone and atenolol, followed with [facility].  - s/p thyroidectomy  - followed with ophthalmologist at [facility].          Relevant Medications    levothyroxine (SYNTHROID, LEVOTHROID) 100 MCG tablet    levothyroxine (Synthroid) 75 MCG tablet (Start on 2025)    Antepartum multigravida of advanced maternal  age    Overview   32-week PDC growth EFW 47%             Previous  section    Pregnancy - Primary    Overview   H/o thyroidectomy s/p Grave's Disease  39-year-old - h/o P C/S at 36w2d in  for FI  Prior  at 36 weeks for fetal intolerance  Already received flu vaccine  Needs PP pap smear  Declines genetic testing  32-week PDC growth EFW 47%             Relevant Orders    POC Urinalysis Dipstick (Completed)       Pregnancy at 34w2d  Fetal status reassuring.   Activity and Exercise discussed.  Fetal movement/PTL or Labor precautions  Patient is on Prenatal vitamins  Reviewed Pre-eclampsia signs/symptoms  GBS next visit  Return in about 2 weeks (around 2025) for Routine prenatal care.    Angel Moran MD  2025

## 2025-05-07 ENCOUNTER — LAB (OUTPATIENT)
Dept: LAB | Facility: HOSPITAL | Age: 40
End: 2025-05-07
Payer: COMMERCIAL

## 2025-05-07 ENCOUNTER — ROUTINE PRENATAL (OUTPATIENT)
Dept: OBSTETRICS AND GYNECOLOGY | Facility: CLINIC | Age: 40
End: 2025-05-07
Payer: COMMERCIAL

## 2025-05-07 VITALS — WEIGHT: 159 LBS | SYSTOLIC BLOOD PRESSURE: 108 MMHG | BODY MASS INDEX: 24.9 KG/M2 | DIASTOLIC BLOOD PRESSURE: 70 MMHG

## 2025-05-07 DIAGNOSIS — Z98.891 PREVIOUS CESAREAN SECTION: ICD-10-CM

## 2025-05-07 DIAGNOSIS — E05.00 GRAVES DISEASE: Chronic | ICD-10-CM

## 2025-05-07 DIAGNOSIS — O09.529 ANTEPARTUM MULTIGRAVIDA OF ADVANCED MATERNAL AGE: ICD-10-CM

## 2025-05-07 DIAGNOSIS — Z34.93 PRENATAL CARE IN THIRD TRIMESTER, UNSPECIFIED GRAVIDITY: Primary | ICD-10-CM

## 2025-05-07 LAB
GLUCOSE UR STRIP-MCNC: NEGATIVE MG/DL
PROT UR STRIP-MCNC: NEGATIVE MG/DL

## 2025-05-07 PROCEDURE — 87081 CULTURE SCREEN ONLY: CPT | Performed by: OBSTETRICS & GYNECOLOGY

## 2025-05-07 NOTE — PROGRESS NOTES
OB FOLLOW UP  CC- Here for care of pregnancy        Denia Gage is a 39 y.o.  36w2d patient being seen today for her obstetrical follow up visit. Patient reports occasional reflux..     Her prenatal care is complicated by (and status) : see below.  Patient Active Problem List   Diagnosis    Graves disease    History of headache    Eyelid retraction or lag    Lagophthalmos of eyelids of both eyes    Proptosis    Thyroid eye disease    Postoperative hypothyroidism    Antepartum multigravida of advanced maternal age    Previous  section    Pregnancy       GBS Status: Done Today. She is not allergic to PCN.    No Known Allergies     Her Delivery Plan is: Undecided    US today: no  Non Stress Test: No.    ROS -   Patient Denies: Loss of Fluid, Vaginal Spotting, Vision Changes, Headaches, Nausea , Vomiting , Contractions, Epigastric pain, and skin itching  Fetal Movement : normal  Other than what is documented in the HPI, all other systems reviewed and are negative.       The additional following portions of the patient's history were reviewed and updated as appropriate: allergies and current medications.    I have reviewed and agree with the HPI, ROS, and historical information as entered above. Angel Moran MD        EXAM:     Prenatal Vitals  BP: 108/70  Weight: 72.1 kg (159 lb)                  Urine Glucose Read-only: Negative  Urine Protein Read-only: Negative           Assessment and Plan    Problem List Items Addressed This Visit       Graves disease (Chronic)    Overview   Was on methimazone and atenolol, followed with [facility].  - s/p thyroidectomy  - followed with ophthalmologist at [facility].          Relevant Medications    levothyroxine (SYNTHROID, LEVOTHROID) 100 MCG tablet    levothyroxine (Synthroid) 75 MCG tablet    Antepartum multigravida of advanced maternal age    Overview   32-week PDC growth EFW 47%             Previous  section     Other Visit  Diagnoses         Prenatal care in third trimester, unspecified     -  Primary    Relevant Orders    Group B Streptococcus Culture - Swab, Vaginal/Rectum    POC Urinalysis Dipstick (Completed)            Pregnancy at 36w2d  Fetal status reassuring.   Lab(s) Ordered  Reviewed Pre-eclampsia signs/symptoms  Delivery options reviewed with patient  Signs of labor reviewed  Kick counts reviewed  Activity and Exercise discussed.  Return in about 1 week (around 2025) for Routine prenatal care.    Angel Moran MD  2025

## 2025-05-10 LAB — BACTERIA SPEC AEROBE CULT: NORMAL

## 2025-05-14 ENCOUNTER — ROUTINE PRENATAL (OUTPATIENT)
Dept: OBSTETRICS AND GYNECOLOGY | Facility: CLINIC | Age: 40
End: 2025-05-14
Payer: COMMERCIAL

## 2025-05-14 VITALS — SYSTOLIC BLOOD PRESSURE: 106 MMHG | BODY MASS INDEX: 25.22 KG/M2 | WEIGHT: 161 LBS | DIASTOLIC BLOOD PRESSURE: 74 MMHG

## 2025-05-14 DIAGNOSIS — Z98.891 PREVIOUS CESAREAN SECTION: ICD-10-CM

## 2025-05-14 DIAGNOSIS — E05.00 GRAVES DISEASE: Chronic | ICD-10-CM

## 2025-05-14 DIAGNOSIS — O09.529 ANTEPARTUM MULTIGRAVIDA OF ADVANCED MATERNAL AGE: ICD-10-CM

## 2025-05-14 DIAGNOSIS — Z3A.37 37 WEEKS GESTATION OF PREGNANCY: Primary | ICD-10-CM

## 2025-05-14 LAB
GLUCOSE UR STRIP-MCNC: NEGATIVE MG/DL
PROT UR STRIP-MCNC: NEGATIVE MG/DL

## 2025-05-14 NOTE — PROGRESS NOTES
OB FOLLOW UP  CC- Here for care of pregnancy        Denia Gage is a 39 y.o.  37w2d patient being seen today for her obstetrical follow up visit. Patient reports no complaints.     Her prenatal care is complicated by (and status) : see below.  Patient Active Problem List   Diagnosis    Graves disease    History of headache    Eyelid retraction or lag    Lagophthalmos of eyelids of both eyes    Proptosis    Thyroid eye disease    Postoperative hypothyroidism    Antepartum multigravida of advanced maternal age    Previous  section    Pregnancy       GBS Status:   Group B Strep Culture   Date Value Ref Range Status   2025 No Group B Streptococcus isolated  Final         No Known Allergies       Her Delivery Plan is: Repeat . Scheduled    US today: no  Non Stress Test: No.    ROS -   Patient Denies: Loss of Fluid, Vaginal Spotting, Vision Changes, Headaches, Nausea , Vomiting , Contractions, Epigastric pain, and skin itching  Fetal Movement : normal  Other than what is documented in the HPI, all other systems reviewed and are negative.       The additional following portions of the patient's history were reviewed and updated as appropriate: allergies, current medications, and problem list.    I have reviewed and agree with the HPI, ROS, and historical information as entered above. Angel Moran MD        EXAM:     Prenatal Vitals  BP: 106/74 (r arm)  Weight: 73 kg (161 lb)                  Urine Glucose Read-only: Negative  Urine Protein Read-only: Negative           Assessment and Plan    Problem List Items Addressed This Visit       Graves disease (Chronic)    Overview   Was on methimazone and atenolol, followed with [facility].  - s/p thyroidectomy  - followed with ophthalmologist at [facility].          Relevant Medications    levothyroxine (SYNTHROID, LEVOTHROID) 100 MCG tablet    levothyroxine (Synthroid) 75 MCG tablet    Antepartum multigravida of advanced  maternal age    Overview   32-week PDC growth EFW 47%             Previous  section    Pregnancy - Primary    Overview   H/o thyroidectomy s/p Grave's Disease  39-year-old - h/o P C/S at 36w2d in  for FI  Prior  at 36 weeks for fetal intolerance  Already received flu vaccine  Needs PP pap smear  Declines genetic testing  32-week PDC growth EFW 47%             Relevant Orders    POC Urinalysis Dipstick (Completed)       Pregnancy at 37w2d  Fetal status reassuring.   Reviewed Pre-eclampsia signs/symptoms  Reviewed upcoming c/s and PAT instructions with patient. Arrival time and NPO status reviewed.   Delivery options reviewed with patient  Signs of labor reviewed  Kick counts reviewed  Activity and Exercise discussed.  Return in about 1 week (around 2025) for Routine prenatal care.    Angel Moran MD  2025

## 2025-05-14 NOTE — PATIENT INSTRUCTIONS
Prenatal Care  Prenatal care is health care you get when pregnant. It helps you and your unborn baby stay as healthy as possible. Start prenatal care early in your pregnancy and continue to go to visits during your pregnancy.  Prenatal care may be given by a midwife, a family practice doctor, a nurse practitioner, physician assistant, or a childbirth and pregnancy doctor.  What are the benefits of prenatal care?  In prenatal care, your health care provider will get to know your medical history. You'll be checked for conditions that might affect you and your baby. Prenatal care will:  Lower the risk for problems as your child grows.  Lower certain risks for your baby, especially the risk that:  Your child may be born early.  Your child will have a low weight at birth.  What can I expect at the first prenatal care visit?  Your first visit will likely be the longest. You should ask to be seen as soon as you know you're pregnant. The first visit is a good time to talk about any questions or concerns. Make a list of questions to ask your provider at your visits.  Medical history  At your visit, you and your provider will talk about your medical history, including:  Your family's medical history and the medical history of the baby's father.  Any past pregnancies and long-term (chronic) health conditions.  Any surgeries or procedures you have had.  All medicines you're taking. Tell them if you're taking herbs or supplements too.  Any tobacco, alcohol, or drug use.  Other problems that may harm you and your baby. Tell them if:  You need food or housing.  You have been around chemicals or radiation.  Your partner yells at you, hits you, or hurts you.  Tests and screenings  Your provider will:  Do a physical exam, including a pelvic and breast exam.  Do tests to check for:  Urinary tract infection (UTI).  Sexually transmitted infections (STIs).  Low iron levels in your blood. This is called anemia.  Blood type and certain  proteins on red blood cells called Rh antibodies.  Infections and immunity to viruses, such as hepatitis B and rubella.  HIV.  Ask your provider if you need to be checked for genetic diseases.  Tips about staying healthy  Your provider will also give you information about how to keep yourself and your baby healthy, including:  Nutrition, vitamins, and food safety.  Physical activity.  How to treat some problems, such as morning sickness.  How to avoid infections and substances that may harm your baby.  Caring for your teeth.  Work and travel.  Problems that require you to call your provider.  How often will I have prenatal care visits?  After your first prenatal care visit, you will have regular visits throughout your pregnancy. You may visit your provider as follows:  Up to week 28 of pregnancy: once every 4 weeks.  28-36 weeks: once every 2 weeks.  After 36 weeks: every week until delivery.  Some people may have more visits. Others may have fewer. It all depends on your health and that of your baby.  Keep all prenatal visits. This is one way for you and your baby to stay as healthy as possible.  What happens during routine prenatal care visits?  Your provider will:  Check your weight and blood pressure.  Check your baby's heart sounds.  Ask questions about your diet, exercise, sleeping patterns, and whether you can feel the baby move.  Ask about any pregnancy symptoms you're having and how you're dealing with them. Tell your provider if:  You throw up or feel like you may throw up.  You have discharge or you bleed from your vagina.  You have trouble pooping (constipation).  You have swelling, headaches, or trouble seeing.  You are very tired, or you feel sad and anxious all the time.  You have discomfort, including back pain or pain in the pelvis.  Tell you problems to watch for during your pregnancy, including signs of labor.  Measure the height of your uterus in your belly. This is called fundal height.  What  tests might I have during prenatal care visits?  You may have blood, urine, and imaging tests. These may include:  Urine tests to check for blood sugar, protein, or signs of infection.  Genetic testing.  Ultrasounds to check your baby's growth, development, and well-being. Your baby may also be checked for congenital conditions.  Glucose tests to check for gestational diabetes. This is a form of diabetes that a person can get when pregnant.  A test to check for group B strep (GBS) infection.  What else can I expect during prenatal care visits?  Your provider may give you some vaccines. Getting certain vaccines during pregnancy can protect your baby after birth. These may include:  A flu shot.  Tdap (tetanus, diphtheria, pertussis) vaccine.  A COVID-19 vaccine.  A RSV vaccine.  Later in your pregnancy, your provider may talk to you about:  Childbirth and childbirth classes.  Breastfeeding and breastfeeding classes.  Birth control after your baby is born.  Where to find more information  Office on Women's Health: womenshealth.gov  American Pregnancy Association: americanpregnancy.org  March of Dimes: marchofdimes.org  This information is not intended to replace advice given to you by your health care provider. Make sure you discuss any questions you have with your health care provider.  Document Revised: 04/22/2024 Document Reviewed: 04/22/2024  Elsevier Patient Education © 2024 Elsevier Inc.   Improved

## 2025-05-19 ENCOUNTER — PREP FOR SURGERY (OUTPATIENT)
Dept: OTHER | Facility: HOSPITAL | Age: 40
End: 2025-05-19
Payer: COMMERCIAL

## 2025-05-19 DIAGNOSIS — Z98.891 PREVIOUS CESAREAN SECTION: Primary | ICD-10-CM

## 2025-05-19 RX ORDER — LIDOCAINE HYDROCHLORIDE 10 MG/ML
0.5 INJECTION, SOLUTION EPIDURAL; INFILTRATION; INTRACAUDAL; PERINEURAL ONCE AS NEEDED
Status: CANCELLED | OUTPATIENT
Start: 2025-05-19

## 2025-05-19 RX ORDER — CARBOPROST TROMETHAMINE 250 UG/ML
250 INJECTION, SOLUTION INTRAMUSCULAR AS NEEDED
Status: CANCELLED | OUTPATIENT
Start: 2025-05-19

## 2025-05-19 RX ORDER — METHYLERGONOVINE MALEATE 0.2 MG/ML
200 INJECTION INTRAVENOUS ONCE AS NEEDED
Status: CANCELLED | OUTPATIENT
Start: 2025-05-19

## 2025-05-19 RX ORDER — OXYTOCIN/0.9 % SODIUM CHLORIDE 30/500 ML
85 PLASTIC BAG, INJECTION (ML) INTRAVENOUS ONCE
Status: CANCELLED | OUTPATIENT
Start: 2025-05-19 | End: 2025-05-19

## 2025-05-19 RX ORDER — SODIUM CHLORIDE 0.9 % (FLUSH) 0.9 %
10 SYRINGE (ML) INJECTION AS NEEDED
Status: CANCELLED | OUTPATIENT
Start: 2025-05-19

## 2025-05-19 RX ORDER — KETOROLAC TROMETHAMINE 30 MG/ML
30 INJECTION, SOLUTION INTRAMUSCULAR; INTRAVENOUS ONCE
Status: CANCELLED | OUTPATIENT
Start: 2025-05-19 | End: 2025-05-19

## 2025-05-19 RX ORDER — SODIUM CHLORIDE 0.9 % (FLUSH) 0.9 %
10 SYRINGE (ML) INJECTION EVERY 12 HOURS SCHEDULED
Status: CANCELLED | OUTPATIENT
Start: 2025-05-19

## 2025-05-19 RX ORDER — BUPIVACAINE HCL/0.9 % NACL/PF 0.1 %
2000 PLASTIC BAG, INJECTION (ML) EPIDURAL ONCE
Status: CANCELLED | OUTPATIENT
Start: 2025-05-19 | End: 2025-05-19

## 2025-05-19 RX ORDER — CITRIC ACID/SODIUM CITRATE 334-500MG
30 SOLUTION, ORAL ORAL ONCE
Status: CANCELLED | OUTPATIENT
Start: 2025-05-19 | End: 2025-05-19

## 2025-05-19 RX ORDER — SODIUM CHLORIDE, SODIUM LACTATE, POTASSIUM CHLORIDE, CALCIUM CHLORIDE 600; 310; 30; 20 MG/100ML; MG/100ML; MG/100ML; MG/100ML
125 INJECTION, SOLUTION INTRAVENOUS CONTINUOUS
Status: CANCELLED | OUTPATIENT
Start: 2025-05-19 | End: 2025-05-20

## 2025-05-19 RX ORDER — MISOPROSTOL 200 UG/1
800 TABLET ORAL AS NEEDED
Status: CANCELLED | OUTPATIENT
Start: 2025-05-19

## 2025-05-19 RX ORDER — SODIUM CHLORIDE 9 MG/ML
40 INJECTION, SOLUTION INTRAVENOUS AS NEEDED
Status: CANCELLED | OUTPATIENT
Start: 2025-05-19

## 2025-05-19 RX ORDER — OXYTOCIN/0.9 % SODIUM CHLORIDE 30/500 ML
650 PLASTIC BAG, INJECTION (ML) INTRAVENOUS ONCE
Status: CANCELLED | OUTPATIENT
Start: 2025-05-19 | End: 2025-05-19

## 2025-05-19 RX ORDER — ACETAMINOPHEN 500 MG
1000 TABLET ORAL ONCE
Status: CANCELLED | OUTPATIENT
Start: 2025-05-19 | End: 2025-05-19

## 2025-05-21 ENCOUNTER — ROUTINE PRENATAL (OUTPATIENT)
Dept: OBSTETRICS AND GYNECOLOGY | Facility: CLINIC | Age: 40
End: 2025-05-21
Payer: COMMERCIAL

## 2025-05-21 VITALS — BODY MASS INDEX: 24.87 KG/M2 | WEIGHT: 158.8 LBS | DIASTOLIC BLOOD PRESSURE: 74 MMHG | SYSTOLIC BLOOD PRESSURE: 112 MMHG

## 2025-05-21 DIAGNOSIS — H57.89 THYROID EYE DISEASE: ICD-10-CM

## 2025-05-21 DIAGNOSIS — Z34.93 PRENATAL CARE IN THIRD TRIMESTER, UNSPECIFIED GRAVIDITY: ICD-10-CM

## 2025-05-21 DIAGNOSIS — E05.00 GRAVES DISEASE: Chronic | ICD-10-CM

## 2025-05-21 DIAGNOSIS — Z3A.38 38 WEEKS GESTATION OF PREGNANCY: Primary | ICD-10-CM

## 2025-05-21 DIAGNOSIS — E07.9 THYROID EYE DISEASE: ICD-10-CM

## 2025-05-21 LAB
BILIRUB BLD-MCNC: NEGATIVE MG/DL
CLARITY, POC: ABNORMAL
COLOR UR: ABNORMAL
GLUCOSE UR STRIP-MCNC: NEGATIVE MG/DL
KETONES UR QL: ABNORMAL
LEUKOCYTE EST, POC: ABNORMAL
NITRITE UR-MCNC: NEGATIVE MG/ML
PH UR: 6 [PH] (ref 5–8)
PROT UR STRIP-MCNC: ABNORMAL MG/DL
RBC # UR STRIP: NEGATIVE /UL
SP GR UR: 1.02 (ref 1–1.03)
UROBILINOGEN UR QL: ABNORMAL

## 2025-05-21 RX ORDER — NITROFURANTOIN 25; 75 MG/1; MG/1
100 CAPSULE ORAL 2 TIMES DAILY
Qty: 14 CAPSULE | Refills: 0 | Status: ON HOLD | OUTPATIENT
Start: 2025-05-21 | End: 2025-05-28

## 2025-05-21 NOTE — PROGRESS NOTES
OB FOLLOW UP  CC- Here for care of pregnancy        Denia Gage is a 39 y.o.  38w2d patient being seen today for her obstetrical follow up visit. Patient reports mild swelling in lower extremities without pitting.     Her prenatal care is complicated by (and status) : see below.  Patient Active Problem List   Diagnosis    Graves disease    History of headache    Eyelid retraction or lag    Lagophthalmos of eyelids of both eyes    Proptosis    Thyroid eye disease    Postoperative hypothyroidism    Antepartum multigravida of advanced maternal age    Previous  section    Pregnancy       GBS Status:   Group B Strep Culture   Date Value Ref Range Status   2025 No Group B Streptococcus isolated  Final         No Known Allergies       Her Delivery Plan is: Repeat . Scheduled    US today: no  Non Stress Test: No.    ROS -   Patient Denies: Loss of Fluid, Vaginal Spotting, Vision Changes, Headaches, Contractions, Epigastric pain, and skin itching  Fetal Movement : normal  Other than what is documented in the HPI, all other systems reviewed and are negative.       The additional following portions of the patient's history were reviewed and updated as appropriate: allergies, current medications, past family history, past medical history, past social history, past surgical history, and problem list.    I have reviewed and agree with the HPI, ROS, and historical information as entered above. Me        EXAM:     Prenatal Vitals  BP: 112/74  Weight: 72 kg (158 lb 12.8 oz)                  Urine Glucose Read-only: Negative  Urine Protein Read-only: (!) Trace           Assessment and Plan    Problem List Items Addressed This Visit       Graves disease (Chronic)    Overview   Was on methimazone and atenolol, followed with [facility].  - s/p thyroidectomy  - followed with ophthalmologist at [facility].          Relevant Medications    levothyroxine (SYNTHROID, LEVOTHROID) 100 MCG tablet     levothyroxine (Synthroid) 75 MCG tablet    Thyroid eye disease    Overview   Formatting of this note might be different from the original.  Last Assessment & Plan:   Formatting of this note might be different from the original.  On methimazone and atenolol, follows with [facility].  - plan is for thyroidectomy in the next month or 2.   - follows with ophthalmologist at [facility].   - does not believe she needs a pre-op exam at this time.   - weakness, weight loss, HAs, resolved.           Pregnancy - Primary    Overview   H/o thyroidectomy s/p Grave's Disease  39-year-old - h/o P C/S at 36w2d in  for FI  Prior  at 36 weeks for fetal intolerance  Already received flu vaccine  Needs PP pap smear  Declines genetic testing  32-week PDC growth EFW 47%              Other Visit Diagnoses         Prenatal care in third trimester, unspecified         Relevant Orders    POC Urinalysis Dipstick (Completed)    Urine Culture - Urine, Urine, Clean Catch            Pregnancy at 38w2d  Fetal status reassuring.   Lab(s) Ordered  Medication(s) Ordered  Reviewed Pre-eclampsia signs/symptoms  Reviewed upcoming c/s and PAT instructions with patient. Arrival time and NPO status reviewed.   Delivery options reviewed with patient  Signs of labor reviewed  Kick counts reviewed  Activity and Exercise discussed.  Return for having CS on Tue. Please schedule follow postop in 3 weeks.    Angel Moran MD  2025

## 2025-05-23 LAB
BACTERIA UR CULT: NO GROWTH
BACTERIA UR CULT: NORMAL

## 2025-05-26 ENCOUNTER — ANESTHESIA EVENT (OUTPATIENT)
Dept: LABOR AND DELIVERY | Facility: HOSPITAL | Age: 40
End: 2025-05-26
Payer: COMMERCIAL

## 2025-05-26 ENCOUNTER — HOSPITAL ENCOUNTER (INPATIENT)
Facility: HOSPITAL | Age: 40
LOS: 3 days | Discharge: HOME | End: 2025-05-29
Attending: OBSTETRICS & GYNECOLOGY | Admitting: OBSTETRICS & GYNECOLOGY
Payer: COMMERCIAL

## 2025-05-26 ENCOUNTER — ANESTHESIA (OUTPATIENT)
Dept: LABOR AND DELIVERY | Facility: HOSPITAL | Age: 40
End: 2025-05-26
Payer: COMMERCIAL

## 2025-05-26 PROBLEM — Z37.9 NORMAL LABOR: Status: ACTIVE | Noted: 2025-05-26

## 2025-05-26 LAB
ABO GROUP BLD: NORMAL
ALP SERPL-CCNC: 234 U/L (ref 39–117)
ALT SERPL W P-5'-P-CCNC: 32 U/L (ref 1–33)
AST SERPL-CCNC: 29 U/L (ref 1–32)
ATMOSPHERIC PRESS: ABNORMAL MM[HG]
ATMOSPHERIC PRESS: ABNORMAL MM[HG]
BASE EXCESS BLDCOA CALC-SCNC: -5.3 MMOL/L (ref 0–2)
BASE EXCESS BLDCOV CALC-SCNC: -1.9 MMOL/L (ref 0–2)
BDY SITE: ABNORMAL
BDY SITE: ABNORMAL
BILIRUB SERPL-MCNC: 0.3 MG/DL (ref 0–1.2)
BLD GP AB SCN SERPL QL: NEGATIVE
BODY TEMPERATURE: 37
BODY TEMPERATURE: 37
CO2 BLDA-SCNC: 25.1 MMOL/L (ref 22–33)
CO2 BLDA-SCNC: 25.7 MMOL/L (ref 22–33)
CREAT SERPL-MCNC: 0.47 MG/DL (ref 0.57–1)
DEPRECATED RDW RBC AUTO: 39.8 FL (ref 37–54)
EPAP: 0
EPAP: 0
ERYTHROCYTE [DISTWIDTH] IN BLOOD BY AUTOMATED COUNT: 12.5 % (ref 12.3–15.4)
HCO3 BLDCOA-SCNC: 23.4 MMOL/L (ref 16.9–20.5)
HCO3 BLDCOV-SCNC: 24.3 MMOL/L (ref 18.6–21.4)
HCT VFR BLD AUTO: 38.1 % (ref 34–46.6)
HGB BLD-MCNC: 13.2 G/DL (ref 12–15.9)
HGB BLDA-MCNC: 17.7 G/DL (ref 14–18)
HGB BLDA-MCNC: 18.7 G/DL (ref 14–18)
INHALED O2 CONCENTRATION: 21 %
INHALED O2 CONCENTRATION: 21 %
IPAP: 0
IPAP: 0
LDH SERPL-CCNC: 234 U/L (ref 135–214)
MCH RBC QN AUTO: 30.1 PG (ref 26.6–33)
MCHC RBC AUTO-ENTMCNC: 34.6 G/DL (ref 31.5–35.7)
MCV RBC AUTO: 87 FL (ref 79–97)
MODALITY: ABNORMAL
MODALITY: ABNORMAL
NOTE: 0
PAW @ PEAK INSP FLOW SETTING VENT: 0 CMH2O
PAW @ PEAK INSP FLOW SETTING VENT: 0 CMH2O
PCO2 BLDCOA: 55.5 MMHG (ref 43.3–54.9)
PCO2 BLDCOV: 45.5 MM HG (ref 28–40)
PH BLDCOA: 7.23 PH UNITS (ref 7.22–7.3)
PH BLDCOV: 7.34 PH UNITS (ref 7.31–7.37)
PLATELET # BLD AUTO: 321 10*3/MM3 (ref 140–450)
PMV BLD AUTO: 9.4 FL (ref 6–12)
PO2 BLDCOA: 27.4 MMHG (ref 11.5–43.3)
PO2 BLDCOV: 26.5 MM HG (ref 21–31)
RBC # BLD AUTO: 4.38 10*6/MM3 (ref 3.77–5.28)
RH BLD: POSITIVE
SAO2 % BLDCOA: 52.7 %
SAO2 % BLDCOA: ABNORMAL %
SAO2 % BLDCOV: 61.9 %
T&S EXPIRATION DATE: NORMAL
TOTAL RATE: 0 BREATHS/MINUTE
TOTAL RATE: 0 BREATHS/MINUTE
TREPONEMA PALLIDUM IGG+IGM AB [PRESENCE] IN SERUM OR PLASMA BY IMMUNOASSAY: NORMAL
URATE SERPL-MCNC: 4 MG/DL (ref 2.4–5.7)
VENTILATOR MODE: ABNORMAL
VENTILATOR MODE: ABNORMAL
WBC NRBC COR # BLD AUTO: 8.46 10*3/MM3 (ref 3.4–10.8)

## 2025-05-26 PROCEDURE — 86900 BLOOD TYPING SEROLOGIC ABO: CPT | Performed by: OBSTETRICS & GYNECOLOGY

## 2025-05-26 PROCEDURE — 84550 ASSAY OF BLOOD/URIC ACID: CPT | Performed by: OBSTETRICS & GYNECOLOGY

## 2025-05-26 PROCEDURE — 25810000003 LACTATED RINGERS PER 1000 ML: Performed by: OBSTETRICS & GYNECOLOGY

## 2025-05-26 PROCEDURE — 86780 TREPONEMA PALLIDUM: CPT | Performed by: OBSTETRICS & GYNECOLOGY

## 2025-05-26 PROCEDURE — 25010000002 METOCLOPRAMIDE PER 10 MG: Performed by: ANESTHESIOLOGY

## 2025-05-26 PROCEDURE — 25010000002 CEFAZOLIN PER 500 MG: Performed by: OBSTETRICS & GYNECOLOGY

## 2025-05-26 PROCEDURE — 82247 BILIRUBIN TOTAL: CPT | Performed by: OBSTETRICS & GYNECOLOGY

## 2025-05-26 PROCEDURE — 86850 RBC ANTIBODY SCREEN: CPT | Performed by: OBSTETRICS & GYNECOLOGY

## 2025-05-26 PROCEDURE — 83615 LACTATE (LD) (LDH) ENZYME: CPT | Performed by: OBSTETRICS & GYNECOLOGY

## 2025-05-26 PROCEDURE — 25810000003 LACTATED RINGERS PER 1000 ML: Performed by: ANESTHESIOLOGY

## 2025-05-26 PROCEDURE — 25010000002 ONDANSETRON PER 1 MG: Performed by: ANESTHESIOLOGY

## 2025-05-26 PROCEDURE — 25010000002 FAMOTIDINE (PF) 20 MG/2ML SOLUTION: Performed by: ANESTHESIOLOGY

## 2025-05-26 PROCEDURE — 25010000002 LIDOCAINE-EPINEPHRINE (PF) 2 %-1:200000 SOLUTION: Performed by: ANESTHESIOLOGY

## 2025-05-26 PROCEDURE — 25010000002 MORPHINE PER 10 MG: Performed by: ANESTHESIOLOGY

## 2025-05-26 PROCEDURE — 82565 ASSAY OF CREATININE: CPT | Performed by: OBSTETRICS & GYNECOLOGY

## 2025-05-26 PROCEDURE — 25010000002 FENTANYL CITRATE (PF) 50 MCG/ML SOLUTION: Performed by: ANESTHESIOLOGY

## 2025-05-26 PROCEDURE — 59515 CESAREAN DELIVERY: CPT | Performed by: OBSTETRICS & GYNECOLOGY

## 2025-05-26 PROCEDURE — 84075 ASSAY ALKALINE PHOSPHATASE: CPT | Performed by: OBSTETRICS & GYNECOLOGY

## 2025-05-26 PROCEDURE — 85027 COMPLETE CBC AUTOMATED: CPT | Performed by: OBSTETRICS & GYNECOLOGY

## 2025-05-26 PROCEDURE — 25010000002 ROPIVACAINE PER 1 MG: Performed by: ANESTHESIOLOGY

## 2025-05-26 PROCEDURE — 84460 ALANINE AMINO (ALT) (SGPT): CPT | Performed by: OBSTETRICS & GYNECOLOGY

## 2025-05-26 PROCEDURE — 88302 TISSUE EXAM BY PATHOLOGIST: CPT | Performed by: OBSTETRICS & GYNECOLOGY

## 2025-05-26 PROCEDURE — 82805 BLOOD GASES W/O2 SATURATION: CPT

## 2025-05-26 PROCEDURE — 84450 TRANSFERASE (AST) (SGOT): CPT | Performed by: OBSTETRICS & GYNECOLOGY

## 2025-05-26 PROCEDURE — 25010000002 OXYTOCIN PER 10 UNITS: Performed by: ANESTHESIOLOGY

## 2025-05-26 PROCEDURE — 58611 LIGATE OVIDUCT(S) ADD-ON: CPT | Performed by: OBSTETRICS & GYNECOLOGY

## 2025-05-26 PROCEDURE — 0UB70ZZ EXCISION OF BILATERAL FALLOPIAN TUBES, OPEN APPROACH: ICD-10-PCS | Performed by: OBSTETRICS & GYNECOLOGY

## 2025-05-26 PROCEDURE — 25010000002 BUPIVACAINE (PF) 0.25 % SOLUTION: Performed by: ANESTHESIOLOGY

## 2025-05-26 PROCEDURE — 25010000002 LIDOCAINE-EPINEPHRINE (PF) 1.5 %-1:200000 SOLUTION: Performed by: ANESTHESIOLOGY

## 2025-05-26 PROCEDURE — 86901 BLOOD TYPING SEROLOGIC RH(D): CPT | Performed by: OBSTETRICS & GYNECOLOGY

## 2025-05-26 PROCEDURE — C1755 CATHETER, INTRASPINAL: HCPCS | Performed by: ANESTHESIOLOGY

## 2025-05-26 RX ORDER — SODIUM CHLORIDE, SODIUM LACTATE, POTASSIUM CHLORIDE, CALCIUM CHLORIDE 600; 310; 30; 20 MG/100ML; MG/100ML; MG/100ML; MG/100ML
125 INJECTION, SOLUTION INTRAVENOUS CONTINUOUS
Status: DISCONTINUED | OUTPATIENT
Start: 2025-05-26 | End: 2025-05-27

## 2025-05-26 RX ORDER — FENTANYL CITRATE 50 UG/ML
INJECTION, SOLUTION INTRAMUSCULAR; INTRAVENOUS AS NEEDED
Status: DISCONTINUED | OUTPATIENT
Start: 2025-05-26 | End: 2025-05-26 | Stop reason: SURG

## 2025-05-26 RX ORDER — ACETAMINOPHEN 500 MG
1000 TABLET ORAL ONCE
Status: COMPLETED | OUTPATIENT
Start: 2025-05-26 | End: 2025-05-26

## 2025-05-26 RX ORDER — FAMOTIDINE 10 MG/ML
INJECTION, SOLUTION INTRAVENOUS AS NEEDED
Status: DISCONTINUED | OUTPATIENT
Start: 2025-05-26 | End: 2025-05-26 | Stop reason: SURG

## 2025-05-26 RX ORDER — METHYLERGONOVINE MALEATE 0.2 MG/ML
200 INJECTION INTRAVENOUS ONCE AS NEEDED
Status: DISCONTINUED | OUTPATIENT
Start: 2025-05-26 | End: 2025-05-27 | Stop reason: HOSPADM

## 2025-05-26 RX ORDER — LIDOCAINE HYDROCHLORIDE AND EPINEPHRINE 15; 5 MG/ML; UG/ML
INJECTION, SOLUTION EPIDURAL AS NEEDED
Status: DISCONTINUED | OUTPATIENT
Start: 2025-05-26 | End: 2025-05-26 | Stop reason: SURG

## 2025-05-26 RX ORDER — MAGNESIUM CARB/ALUMINUM HYDROX 105-160MG
30 TABLET,CHEWABLE ORAL ONCE
Status: DISCONTINUED | OUTPATIENT
Start: 2025-05-26 | End: 2025-05-27 | Stop reason: HOSPADM

## 2025-05-26 RX ORDER — METOCLOPRAMIDE HYDROCHLORIDE 5 MG/ML
10 INJECTION INTRAMUSCULAR; INTRAVENOUS ONCE AS NEEDED
Status: DISCONTINUED | OUTPATIENT
Start: 2025-05-26 | End: 2025-05-27 | Stop reason: HOSPADM

## 2025-05-26 RX ORDER — ACETAMINOPHEN 325 MG/1
650 TABLET ORAL EVERY 4 HOURS PRN
Status: DISCONTINUED | OUTPATIENT
Start: 2025-05-26 | End: 2025-05-27 | Stop reason: HOSPADM

## 2025-05-26 RX ORDER — SODIUM CHLORIDE 0.9 % (FLUSH) 0.9 %
10 SYRINGE (ML) INJECTION EVERY 12 HOURS SCHEDULED
Status: DISCONTINUED | OUTPATIENT
Start: 2025-05-26 | End: 2025-05-27 | Stop reason: HOSPADM

## 2025-05-26 RX ORDER — LIDOCAINE HYDROCHLORIDE 10 MG/ML
0.5 INJECTION, SOLUTION EPIDURAL; INFILTRATION; INTRACAUDAL; PERINEURAL ONCE AS NEEDED
Status: DISCONTINUED | OUTPATIENT
Start: 2025-05-26 | End: 2025-05-27 | Stop reason: HOSPADM

## 2025-05-26 RX ORDER — CARBOPROST TROMETHAMINE 250 UG/ML
250 INJECTION, SOLUTION INTRAMUSCULAR AS NEEDED
Status: DISCONTINUED | OUTPATIENT
Start: 2025-05-26 | End: 2025-05-27 | Stop reason: HOSPADM

## 2025-05-26 RX ORDER — OXYTOCIN 10 [USP'U]/ML
INJECTION, SOLUTION INTRAMUSCULAR; INTRAVENOUS AS NEEDED
Status: DISCONTINUED | OUTPATIENT
Start: 2025-05-26 | End: 2025-05-26 | Stop reason: SURG

## 2025-05-26 RX ORDER — CITRIC ACID/SODIUM CITRATE 334-500MG
30 SOLUTION, ORAL ORAL ONCE
Status: DISCONTINUED | OUTPATIENT
Start: 2025-05-26 | End: 2025-05-26 | Stop reason: SDUPTHER

## 2025-05-26 RX ORDER — MISOPROSTOL 200 UG/1
800 TABLET ORAL AS NEEDED
Status: DISCONTINUED | OUTPATIENT
Start: 2025-05-26 | End: 2025-05-27 | Stop reason: HOSPADM

## 2025-05-26 RX ORDER — SODIUM CHLORIDE, SODIUM LACTATE, POTASSIUM CHLORIDE, CALCIUM CHLORIDE 600; 310; 30; 20 MG/100ML; MG/100ML; MG/100ML; MG/100ML
125 INJECTION, SOLUTION INTRAVENOUS CONTINUOUS
Status: ACTIVE | OUTPATIENT
Start: 2025-05-26 | End: 2025-05-26

## 2025-05-26 RX ORDER — ROPIVACAINE HYDROCHLORIDE 2 MG/ML
15 INJECTION, SOLUTION EPIDURAL; INFILTRATION; PERINEURAL CONTINUOUS
Status: DISCONTINUED | OUTPATIENT
Start: 2025-05-26 | End: 2025-05-27

## 2025-05-26 RX ORDER — METOCLOPRAMIDE HYDROCHLORIDE 5 MG/ML
INJECTION INTRAMUSCULAR; INTRAVENOUS AS NEEDED
Status: DISCONTINUED | OUTPATIENT
Start: 2025-05-26 | End: 2025-05-26 | Stop reason: SURG

## 2025-05-26 RX ORDER — HYDROMORPHONE HYDROCHLORIDE 1 MG/ML
0.5 INJECTION, SOLUTION INTRAMUSCULAR; INTRAVENOUS; SUBCUTANEOUS
Status: DISCONTINUED | OUTPATIENT
Start: 2025-05-26 | End: 2025-05-27 | Stop reason: HOSPADM

## 2025-05-26 RX ORDER — OXYTOCIN/0.9 % SODIUM CHLORIDE 30/500 ML
PLASTIC BAG, INJECTION (ML) INTRAVENOUS AS NEEDED
Status: DISCONTINUED | OUTPATIENT
Start: 2025-05-26 | End: 2025-05-26 | Stop reason: SURG

## 2025-05-26 RX ORDER — BUTORPHANOL TARTRATE 1 MG/ML
1 INJECTION, SOLUTION INTRAMUSCULAR; INTRAVENOUS
Status: DISCONTINUED | OUTPATIENT
Start: 2025-05-26 | End: 2025-05-27 | Stop reason: HOSPADM

## 2025-05-26 RX ORDER — INDOMETHACIN 25 MG/1
CAPSULE ORAL AS NEEDED
Status: DISCONTINUED | OUTPATIENT
Start: 2025-05-26 | End: 2025-05-26 | Stop reason: SURG

## 2025-05-26 RX ORDER — KETOROLAC TROMETHAMINE 30 MG/ML
30 INJECTION, SOLUTION INTRAMUSCULAR; INTRAVENOUS ONCE
Status: DISCONTINUED | OUTPATIENT
Start: 2025-05-27 | End: 2025-05-27 | Stop reason: HOSPADM

## 2025-05-26 RX ORDER — OXYTOCIN/0.9 % SODIUM CHLORIDE 30/500 ML
650 PLASTIC BAG, INJECTION (ML) INTRAVENOUS ONCE
Status: DISCONTINUED | OUTPATIENT
Start: 2025-05-27 | End: 2025-05-27 | Stop reason: HOSPADM

## 2025-05-26 RX ORDER — MORPHINE SULFATE 0.5 MG/ML
INJECTION, SOLUTION EPIDURAL; INTRATHECAL; INTRAVENOUS AS NEEDED
Status: DISCONTINUED | OUTPATIENT
Start: 2025-05-26 | End: 2025-05-26 | Stop reason: SURG

## 2025-05-26 RX ORDER — SODIUM CHLORIDE, SODIUM LACTATE, POTASSIUM CHLORIDE, CALCIUM CHLORIDE 600; 310; 30; 20 MG/100ML; MG/100ML; MG/100ML; MG/100ML
INJECTION, SOLUTION INTRAVENOUS CONTINUOUS PRN
Status: DISCONTINUED | OUTPATIENT
Start: 2025-05-26 | End: 2025-05-26 | Stop reason: SURG

## 2025-05-26 RX ORDER — SODIUM CHLORIDE 9 MG/ML
40 INJECTION, SOLUTION INTRAVENOUS AS NEEDED
Status: DISCONTINUED | OUTPATIENT
Start: 2025-05-26 | End: 2025-05-27 | Stop reason: HOSPADM

## 2025-05-26 RX ORDER — DIPHENHYDRAMINE HYDROCHLORIDE 50 MG/ML
12.5 INJECTION, SOLUTION INTRAMUSCULAR; INTRAVENOUS EVERY 8 HOURS PRN
Status: DISCONTINUED | OUTPATIENT
Start: 2025-05-26 | End: 2025-05-27 | Stop reason: HOSPADM

## 2025-05-26 RX ORDER — ONDANSETRON 2 MG/ML
4 INJECTION INTRAMUSCULAR; INTRAVENOUS ONCE AS NEEDED
Status: COMPLETED | OUTPATIENT
Start: 2025-05-26 | End: 2025-05-26

## 2025-05-26 RX ORDER — CITRIC ACID/SODIUM CITRATE 334-500MG
30 SOLUTION, ORAL ORAL ONCE
Status: COMPLETED | OUTPATIENT
Start: 2025-05-26 | End: 2025-05-26

## 2025-05-26 RX ORDER — SODIUM CHLORIDE 0.9 % (FLUSH) 0.9 %
10 SYRINGE (ML) INJECTION AS NEEDED
Status: DISCONTINUED | OUTPATIENT
Start: 2025-05-26 | End: 2025-05-27 | Stop reason: HOSPADM

## 2025-05-26 RX ORDER — LIDOCAINE HYDROCHLORIDE AND EPINEPHRINE 20; 5 MG/ML; UG/ML
INJECTION, SOLUTION EPIDURAL; INFILTRATION; INTRACAUDAL; PERINEURAL AS NEEDED
Status: DISCONTINUED | OUTPATIENT
Start: 2025-05-26 | End: 2025-05-26 | Stop reason: SURG

## 2025-05-26 RX ORDER — BUPIVACAINE HYDROCHLORIDE 2.5 MG/ML
INJECTION, SOLUTION EPIDURAL; INFILTRATION; INTRACAUDAL; PERINEURAL AS NEEDED
Status: DISCONTINUED | OUTPATIENT
Start: 2025-05-26 | End: 2025-05-26 | Stop reason: SURG

## 2025-05-26 RX ORDER — FAMOTIDINE 10 MG/ML
20 INJECTION, SOLUTION INTRAVENOUS ONCE AS NEEDED
Status: DISCONTINUED | OUTPATIENT
Start: 2025-05-26 | End: 2025-05-27 | Stop reason: HOSPADM

## 2025-05-26 RX ORDER — OXYTOCIN/0.9 % SODIUM CHLORIDE 30/500 ML
85 PLASTIC BAG, INJECTION (ML) INTRAVENOUS ONCE
Status: DISCONTINUED | OUTPATIENT
Start: 2025-05-27 | End: 2025-05-27 | Stop reason: HOSPADM

## 2025-05-26 RX ORDER — SODIUM CHLORIDE 0.9 % (FLUSH) 0.9 %
10 SYRINGE (ML) INJECTION AS NEEDED
Status: DISCONTINUED | OUTPATIENT
Start: 2025-05-26 | End: 2025-05-27

## 2025-05-26 RX ORDER — EPHEDRINE SULFATE 5 MG/ML
10 INJECTION INTRAVENOUS
Status: DISCONTINUED | OUTPATIENT
Start: 2025-05-26 | End: 2025-05-27 | Stop reason: HOSPADM

## 2025-05-26 RX ORDER — BUTORPHANOL TARTRATE 2 MG/ML
2 INJECTION, SOLUTION INTRAMUSCULAR; INTRAVENOUS
Status: DISCONTINUED | OUTPATIENT
Start: 2025-05-26 | End: 2025-05-27 | Stop reason: HOSPADM

## 2025-05-26 RX ORDER — LIDOCAINE HYDROCHLORIDE 10 MG/ML
0.3 INJECTION, SOLUTION EPIDURAL; INFILTRATION; INTRACAUDAL; PERINEURAL ONCE
Status: DISCONTINUED | OUTPATIENT
Start: 2025-05-26 | End: 2025-05-27

## 2025-05-26 RX ORDER — SODIUM CHLORIDE 0.9 % (FLUSH) 0.9 %
10 SYRINGE (ML) INJECTION EVERY 12 HOURS SCHEDULED
Status: DISCONTINUED | OUTPATIENT
Start: 2025-05-26 | End: 2025-05-27

## 2025-05-26 RX ORDER — ONDANSETRON 2 MG/ML
INJECTION INTRAMUSCULAR; INTRAVENOUS AS NEEDED
Status: DISCONTINUED | OUTPATIENT
Start: 2025-05-26 | End: 2025-05-26 | Stop reason: SURG

## 2025-05-26 RX ADMIN — BUPIVACAINE HYDROCHLORIDE 8 ML: 2.5 INJECTION, SOLUTION EPIDURAL; INFILTRATION; INTRACAUDAL; PERINEURAL at 18:14

## 2025-05-26 RX ADMIN — Medication 500 ML: at 22:46

## 2025-05-26 RX ADMIN — OXYTOCIN 10 UNITS: 10 INJECTION INTRAVENOUS at 22:44

## 2025-05-26 RX ADMIN — LIDOCAINE HYDROCHLORIDE AND EPINEPHRINE 3 ML: 15; 5 INJECTION, SOLUTION EPIDURAL at 18:12

## 2025-05-26 RX ADMIN — ROPIVACAINE HYDROCHLORIDE 15 ML/HR: 2 INJECTION, SOLUTION EPIDURAL; INFILTRATION at 18:18

## 2025-05-26 RX ADMIN — METOCLOPRAMIDE 10 MG: 5 INJECTION, SOLUTION INTRAMUSCULAR; INTRAVENOUS at 22:24

## 2025-05-26 RX ADMIN — LIDOCAINE HYDROCHLORIDE,EPINEPHRINE BITARTRATE 12 ML: 20; .005 INJECTION, SOLUTION EPIDURAL; INFILTRATION; INTRACAUDAL; PERINEURAL at 21:55

## 2025-05-26 RX ADMIN — LIDOCAINE HYDROCHLORIDE,EPINEPHRINE BITARTRATE 5 ML: 20; .005 INJECTION, SOLUTION EPIDURAL; INFILTRATION; INTRACAUDAL; PERINEURAL at 22:24

## 2025-05-26 RX ADMIN — SODIUM BICARBONATE 1 ML: 84 INJECTION INTRAVENOUS at 21:55

## 2025-05-26 RX ADMIN — EPHEDRINE SULFATE 10 MG: 5 INJECTION INTRAVENOUS at 18:55

## 2025-05-26 RX ADMIN — SODIUM CHLORIDE, POTASSIUM CHLORIDE, SODIUM LACTATE AND CALCIUM CHLORIDE 125 ML/HR: 600; 310; 30; 20 INJECTION, SOLUTION INTRAVENOUS at 16:38

## 2025-05-26 RX ADMIN — Medication 500 ML: at 23:10

## 2025-05-26 RX ADMIN — LIDOCAINE HYDROCHLORIDE AND EPINEPHRINE 2 ML: 15; 5 INJECTION, SOLUTION EPIDURAL at 18:13

## 2025-05-26 RX ADMIN — SODIUM CHLORIDE, POTASSIUM CHLORIDE, SODIUM LACTATE AND CALCIUM CHLORIDE: 600; 310; 30; 20 INJECTION, SOLUTION INTRAVENOUS at 22:19

## 2025-05-26 RX ADMIN — SODIUM CHLORIDE, POTASSIUM CHLORIDE, SODIUM LACTATE AND CALCIUM CHLORIDE 125 ML/HR: 600; 310; 30; 20 INJECTION, SOLUTION INTRAVENOUS at 19:59

## 2025-05-26 RX ADMIN — FAMOTIDINE 20 MG: 10 INJECTION, SOLUTION INTRAVENOUS at 22:24

## 2025-05-26 RX ADMIN — SODIUM CHLORIDE 2000 MG: 900 INJECTION INTRAVENOUS at 21:58

## 2025-05-26 RX ADMIN — SODIUM CITRATE AND CITRIC ACID MONOHYDRATE 30 ML: 500; 334 SOLUTION ORAL at 21:58

## 2025-05-26 RX ADMIN — ACETAMINOPHEN 1000 MG: 500 TABLET ORAL at 21:58

## 2025-05-26 RX ADMIN — FENTANYL CITRATE 50 MCG: 50 INJECTION, SOLUTION INTRAMUSCULAR; INTRAVENOUS at 22:55

## 2025-05-26 RX ADMIN — ONDANSETRON 4 MG: 2 INJECTION INTRAMUSCULAR; INTRAVENOUS at 22:24

## 2025-05-26 RX ADMIN — ONDANSETRON 4 MG: 2 INJECTION INTRAMUSCULAR; INTRAVENOUS at 22:14

## 2025-05-26 RX ADMIN — FENTANYL CITRATE 100 MCG: 50 INJECTION, SOLUTION INTRAMUSCULAR; INTRAVENOUS at 18:14

## 2025-05-26 RX ADMIN — FENTANYL CITRATE 50 MCG: 50 INJECTION, SOLUTION INTRAMUSCULAR; INTRAVENOUS at 22:47

## 2025-05-26 RX ADMIN — MORPHINE SULFATE 1.5 MG: 0.5 INJECTION, SOLUTION EPIDURAL; INTRATHECAL; INTRAVENOUS at 22:51

## 2025-05-26 RX ADMIN — MORPHINE SULFATE 3.5 MG: 0.5 INJECTION, SOLUTION EPIDURAL; INTRATHECAL; INTRAVENOUS at 22:45

## 2025-05-26 NOTE — ANESTHESIA PROCEDURE NOTES
Labor Epidural      Patient reassessed immediately prior to procedure    Patient location during procedure: OB  Performed By  Anesthesiologist: Rory Morelos DO  Preanesthetic Checklist  Completed: patient identified, IV checked, site marked, risks and benefits discussed, surgical consent, monitors and equipment checked, pre-op evaluation and timeout performed  Prep:  Pt Position:sitting  Sterile Tech:gloves, mask, sterile barrier and cap  Prep:chlorhexidine gluconate and isopropyl alcohol  Monitoring:blood pressure monitoring and continuous pulse oximetry  Epidural Block Procedure:  Approach:midline  Guidance:landmark technique and palpation technique  Location:L2-L3  Needle Type:Tuohy  Needle Gauge:17 G  Loss of Resistance Medium: air  Loss of Resistance: 5cm  Cath Depth at skin:11 cm  Paresthesia: none  Aspiration:negative  Test Dose:negative  Number of Attempts: 1  Post Assessment:  Dressing:secured with tape and occlusive dressing applied (Tegaderm Placed)  Pt Tolerance:patient tolerated the procedure well with no apparent complications  Complications:no

## 2025-05-26 NOTE — H&P
History and Physical:    Subjective     Chief Complaint   Patient presents with    Contractions       Denia Gage is a 39 y.o. year old  with an Estimated Date of Delivery: 25 currently at 39w0d presenting with  contractions that are regular.  Patient reports painful contractions started early this morning.  While in triage she made cervical change from 1 cm to 4 cm.  She had elevated blood pressures that were mildly elevated as well however she was experiencing pain during the elevations. .    Prenatal care has been with Angel oMran MD.  It has been significant for AMA and history of  section for fetal intolerance of labor  Review of Systems   Constitutional: Negative.    HENT: Negative.     Respiratory: Negative.     Cardiovascular: Negative.    Gastrointestinal: Negative.    Genitourinary: Negative.    Musculoskeletal: Negative.    Skin: Negative.    Allergic/Immunologic: Negative.    Neurological: Negative.    Hematological: Negative.    Psychiatric/Behavioral: Negative.             Past Medical History:   Diagnosis Date    Graves disease 2020    Was on methimazole and atenolol, followed with [facility]. - s/p thyroidectomy - [facility]    History of headache 2020    Resolved after thyroidectomy 2021    Hypothyroidism     postoperative, thyroidectomy 21 for Graves disease    Varicella      Past Surgical History:   Procedure Laterality Date     SECTION N/A 2023    Procedure:  SECTION PRIMARY;  Surgeon: Sugar Darling MD;  Location: [facility] LABOR DELIVERY;  Service: Obstetrics/Gynecology;  Laterality: N/A;    GANGLION CYST EXCISION Left 2021    Dorsal wrist    HYMENECTOMY      THYROIDECTOMY  2021    For Graves disease, difficult to manage     Family History   Problem Relation Age of Onset    Arthritis Mother     Thyroid disease Mother     Retinal detachment Father     Mental illness Maternal  "Uncle     Mental illness Paternal Aunt     Cancer Paternal Aunt     Obesity Paternal Aunt     Arthritis Maternal Grandmother     Hypertension Maternal Grandfather     Ovarian cancer Maternal Aunt     Uterine cancer Maternal Aunt     Diabetes Maternal Aunt      Social History     Tobacco Use    Smoking status: Never    Smokeless tobacco: Never   Vaping Use    Vaping status: Never Used   Substance Use Topics    Alcohol use: Not Currently     Comment: social    Drug use: Never     Medications Prior to Admission   Medication Sig Dispense Refill Last Dose/Taking    aspirin 81 MG chewable tablet Chew 1 tablet Daily.   2025    levothyroxine (SYNTHROID, LEVOTHROID) 100 MCG tablet Take 1 tablet by mouth Daily. 30 tablet 5 2025    Prenatal Vit-Fe Fumarate-FA (PRENATAL VITAMINS PO) Take  by mouth.   2025    levothyroxine (Synthroid) 75 MCG tablet Take 1 tablet by mouth Daily. Start this dose after delivery 30 tablet 3     nitrofurantoin, macrocrystal-monohydrate, (Macrobid) 100 MG capsule Take 1 capsule by mouth 2 (Two) Times a Day for 7 days. 14 capsule 0      Allergies:  Patient has no known allergies.  OB History    Para Term  AB Living   2 1  1  1   SAB IAB Ectopic Molar Multiple Live Births       0 1      # Outcome Date GA Lbr Juan/2nd Weight Sex Type Anes PTL Lv   2 Current            1  23 36w2d  2764 g (6 lb 1.5 oz) M CS-LTranv EPI Y ARNOLDO      Complications: Fetal Intolerance             Objective     /85   Temp 97.8 °F (36.6 °C) (Oral)   Resp 18   Ht 170.2 cm (67\")   Wt 71.7 kg (158 lb)   LMP 2024   BMI 24.75 kg/m²     Physical Exam    General:  No acute distress   Lung: Clear to auscultation bilaterally, no wheezing, clear at bases   Heart: Regular rate and rhythm, no murmurs    Abdomen: Gravid, nontender   Extremities: 2+ DTR's bilaterally, no edema   FHT's: reactive    Cervix: 4/80/-2   [place]: Contraction are every 2 to 3           Lab Review   External " Prenatal Results       Pregnancy Outside Results - Transcribed From Office Records - See Scanned Records For Details       Test Value Date Time    ABO  A  05/26/25 1434    Rh  Positive  05/26/25 1434    Antibody Screen  Negative  05/26/25 1434       Negative  03/12/25 1341       Negative  10/30/24 0857    Varicella IgG       Rubella  0.90 index 10/30/24 0857    Hgb  13.2 g/dL 05/26/25 1434       11.9 g/dL 03/12/25 1341       12.7 g/dL 02/12/25 1610       13.7 g/dL 10/30/24 0857    Hct  38.1 % 05/26/25 1434       35.4 % 03/12/25 1341       38.3 % 02/12/25 1610       44.2 % 10/30/24 0857    HgB A1c        1h GTT  77 mg/dL 03/12/25 1341    3h GTT Fasting       3h GTT 1 hour       3h GTT 2 hour       3h GTT 3 hour        Gonorrhea (discrete)  Negative  10/30/24 0857    Chlamydia (discrete)  Negative  10/30/24 0857    RPR  Non Reactive  03/12/25 1341       Non Reactive  10/30/24 0857    Syphils cascade: TP-Ab (FTA)       TP-Ab       TP-Ab (EIA)       TPPA       HBsAg  Negative  10/30/24 0857    Herpes Simplex Virus PCR       Herpes Simplex VIrus Culture       HIV  Non Reactive  10/30/24 0857    Hep C RNA Quant PCR       Hep C Antibody  Non Reactive  10/30/24 0857    AFP       NIPT       Cystic Fibrosis (James)       Cystic Fibroisis        Spinal Muscular atrophy       Fragile X       Group B Strep  No Group B Streptococcus isolated  05/07/25 1802    GBS Susceptibility to Clindamycin       GBS Susceptibility to Erythromycin       Fetal Fibronectin       Genetic Testing, Maternal Blood                 Drug Screening       Test Value Date Time    Urine Drug Screen       Amphetamine Screen  Negative ng/mL 10/30/24 0857    Barbiturate Screen  Negative ng/mL 10/30/24 0857    Benzodiazepine Screen  Negative ng/mL 10/30/24 0857    Methadone Screen  Negative ng/mL 10/30/24 0857    Phencyclidine Screen  Negative ng/mL 10/30/24 0857    Opiates Screen       THC Screen       Cocaine Screen       Propoxyphene Screen  Negative ng/mL  10/30/24 0857    Buprenorphine Screen       Methamphetamine Screen       Oxycodone Screen       Tricyclic Antidepressants Screen                 Legend    ^: Historical                                Lab Results   Component Value Date    ALKPHOS 234 (H) 2025    ALT 32 2025    AST 29 2025    CREATININE 0.47 (L) 2025    BILITOT 0.3 2025     (H) 2025    URICACID 4.0 2025     Lab Results   Component Value Date    WBC 8.46 2025    HGB 13.2 2025    HCT 38.1 2025    MCV 87.0 2025     2025        Results for orders placed in visit on 25    US Ob Follow Up Transabdominal Approach    Narrative  PAT NAME: ANA HANNA  MED REC#: [id number]  BIRTH DA: [id number]  PAT GEND: F  ACCOUNT#: [id number]  PAT TYPE: O  EXAM DICK: [id number]  REF PHYS PRISCILA OSORIO  ACCESSION [id number]      Indication  ========    Graves Disease; Small for Dates      Comparison Studies  =================    The findings of this study are compared to the prior ultrasound study dated , 2025      Method  =======    [place] E6, Transabdominal ultrasound examination. View: Adequate view      Pregnancy  =========    [facility] pregnancy. Number of fetuses: 1      Dating  ======    LMP on: 2024  GA by LMP 32 w + 2 d  NICOLASA by LMP: 2025  Method of dating: based on stated NICOLASA  GA by prior assessment 32 w + 2 d  NICOLASA by prior assessment: 2025  Ultrasound examination on: 2025  GA by U/S based upon: AC, BPD, Femur, HC  GA by U/S 32 w + 3 d  NICOLASA by U/S: 2025  Previous dating: based on stated NICOLASA, selected on 2025  Agreed NICOLASA of previous datin2025  Assigned: based on stated NICOLASA, selected on 2025  Assigned GA 32 w + 2 d  Assigned NICOLASA: 2025  Pregnancy length 280 d      General Evaluation  ================    Cardiac activity present.  bpm.  Fetal movements visualized.  Presentation cephalic.  Placenta Placental site:  anterior.  Umbilical cord Cord vessels: 3 vessel cord.  Amniotic fluid Amount of AF: normal. MVP 3.9 cm.      Fetal Biometry  ============    Standard  BPD 80.8 mm  32w 3d        47%        Hadlock    .0 mm  32w 0d        11%        Hadlock    .8 mm  32w 0d        38%        Hadlock    Femur 63.9 mm  33w 0d        58%        Hadlock    HC / AC 1.04    EFW 1,948 g  47%        Ranjeet    EFW (lb) 4 lb  EFW (oz) 5 oz  EFW by: Hadlock (BPD-HC-AC-FL)  Other: An ultrasound for fetal weight has a margin of error of up to twenty percent.  Extremities / Bony Struc  FL / BPD 0.79    FL / HC 0.22    FL / AC 0.23    Other Structures   bpm      Fetal Anatomy  ============    Lateral ventricles: Appears normal  4-chamber view: Appears normal  Stomach: Appears normal  Kidneys: Appears normal  Bladder: Appears normal  Gender: female  Wants to know gender: yes      Impression  ==========    Viable SIUP in the cephalic presentation. There is appropriate interval fetal growth & normal amniotic fluid quantity      Recommendation  ===============    follow up imaging as clinically indicated        Sonographer: Flaquita W[facility]  Physician: Angel Moran MD, FACOG    Electronically signed by: Angel Moran MD, FACOG at: 2025/04/10 13:09              Patient Active Problem List    Diagnosis Date Noted    Pregnancy 2025     Note Last Updated: 4/10/2025     H/o thyroidectomy s/p Grave's Disease  39-year-old - h/o P C/S at 36w2d in  for FI  Prior  at 36 weeks for fetal intolerance  Already received flu vaccine  Needs PP pap smear  Declines genetic testing  32-week PDC growth EFW 47%          Antepartum multigravida of advanced maternal age 10/30/2024     Note Last Updated: 4/10/2025     32-week PDC growth EFW 47%          Previous  section 10/30/2024    Postoperative hypothyroidism 2022    Eyelid retraction or lag 2022    Lagophthalmos of eyelids of both eyes 2022     Proptosis 2022    History of headache 2020    Graves disease 2020     Note Last Updated: 3/3/2022     Was on methimazone and atenolol, followed with [facility].  - s/p thyroidectomy  - followed with ophthalmologist at [facility].       Thyroid eye disease 2020     Note Last Updated: 4/10/2025     Formatting of this note might be different from the original.  Last Assessment & Plan:   Formatting of this note might be different from the original.  On methimazone and atenolol, follows with [facility].  - plan is for thyroidectomy in the next month or 2.   - follows with ophthalmologist at [facility].   - does not believe she needs a pre-op exam at this time.   - weakness, weight loss, HAs, resolved.            Assessment & Plan     ASSESSMENT  IUP at 39w0d  labor  History of  section-risks and benefits of  section versus Tolac discussed with patient.  After careful consideration, patient desires Tolac.  Gestational hypertension versus mildly elevated blood pressure secondary to painful contractions   3. GBBS negative    PLAN  Admit to labor and delivery   2.   Expectant management         Magui Gary MD  2025@

## 2025-05-26 NOTE — ANESTHESIA PREPROCEDURE EVALUATION
Anesthesia Evaluation     Patient summary reviewed and Nursing notes reviewed                Airway   Mallampati: I  TM distance: >3 FB  Neck ROM: full  No difficulty expected  Dental      Pulmonary - negative pulmonary ROS   Cardiovascular - negative cardio ROS        Neuro/Psych- negative ROS  GI/Hepatic/Renal/Endo    (+) thyroid problem hypothyroidism    Musculoskeletal (-) negative ROS    Abdominal    Substance History - negative use     OB/GYN    (+) Pregnant        Other                    Anesthesia Plan    ASA 2     epidural       Anesthetic plan, risks, benefits, and alternatives have been provided, discussed and informed consent has been obtained with: patient.    CODE STATUS:    Code Status (Patient has no pulse and is not breathing): CPR (Attempt to Resuscitate)  Medical Interventions (Patient has pulse or is breathing): Full Support  Level Of Support Discussed With: Patient

## 2025-05-27 LAB
BASOPHILS # BLD AUTO: 0.02 10*3/MM3 (ref 0–0.2)
BASOPHILS NFR BLD AUTO: 0.1 % (ref 0–1.5)
DEPRECATED RDW RBC AUTO: 40.9 FL (ref 37–54)
EOSINOPHIL # BLD AUTO: 0.01 10*3/MM3 (ref 0–0.4)
EOSINOPHIL NFR BLD AUTO: 0.1 % (ref 0.3–6.2)
ERYTHROCYTE [DISTWIDTH] IN BLOOD BY AUTOMATED COUNT: 12.5 % (ref 12.3–15.4)
HCT VFR BLD AUTO: 34.7 % (ref 34–46.6)
HGB BLD-MCNC: 11.6 G/DL (ref 12–15.9)
IMM GRANULOCYTES # BLD AUTO: 0.08 10*3/MM3 (ref 0–0.05)
IMM GRANULOCYTES NFR BLD AUTO: 0.6 % (ref 0–0.5)
LYMPHOCYTES # BLD AUTO: 1.66 10*3/MM3 (ref 0.7–3.1)
LYMPHOCYTES NFR BLD AUTO: 11.8 % (ref 19.6–45.3)
MCH RBC QN AUTO: 30.1 PG (ref 26.6–33)
MCHC RBC AUTO-ENTMCNC: 33.4 G/DL (ref 31.5–35.7)
MCV RBC AUTO: 90.1 FL (ref 79–97)
MONOCYTES # BLD AUTO: 1.05 10*3/MM3 (ref 0.1–0.9)
MONOCYTES NFR BLD AUTO: 7.5 % (ref 5–12)
NEUTROPHILS NFR BLD AUTO: 11.21 10*3/MM3 (ref 1.7–7)
NEUTROPHILS NFR BLD AUTO: 79.9 % (ref 42.7–76)
NRBC BLD AUTO-RTO: 0 /100 WBC (ref 0–0.2)
PLATELET # BLD AUTO: 259 10*3/MM3 (ref 140–450)
PMV BLD AUTO: 9.1 FL (ref 6–12)
RBC # BLD AUTO: 3.85 10*6/MM3 (ref 3.77–5.28)
WBC NRBC COR # BLD AUTO: 14.03 10*3/MM3 (ref 3.4–10.8)

## 2025-05-27 PROCEDURE — 25010000002 KETOROLAC TROMETHAMINE PER 15 MG

## 2025-05-27 PROCEDURE — 63710000001 PROMETHAZINE PER 25 MG: Performed by: OBSTETRICS & GYNECOLOGY

## 2025-05-27 PROCEDURE — 85025 COMPLETE CBC W/AUTO DIFF WBC: CPT | Performed by: OBSTETRICS & GYNECOLOGY

## 2025-05-27 PROCEDURE — 59025 FETAL NON-STRESS TEST: CPT

## 2025-05-27 PROCEDURE — 0503F POSTPARTUM CARE VISIT: CPT

## 2025-05-27 PROCEDURE — 25010000002 KETOROLAC TROMETHAMINE PER 15 MG: Performed by: OBSTETRICS & GYNECOLOGY

## 2025-05-27 RX ORDER — PROMETHAZINE HYDROCHLORIDE 25 MG/1
25 TABLET ORAL EVERY 6 HOURS PRN
Status: DISCONTINUED | OUTPATIENT
Start: 2025-05-27 | End: 2025-05-29 | Stop reason: HOSPADM

## 2025-05-27 RX ORDER — ACETAMINOPHEN 500 MG
1000 TABLET ORAL EVERY 6 HOURS
Status: COMPLETED | OUTPATIENT
Start: 2025-05-27 | End: 2025-05-27

## 2025-05-27 RX ORDER — KETOROLAC TROMETHAMINE 30 MG/ML
30 INJECTION, SOLUTION INTRAMUSCULAR; INTRAVENOUS ONCE AS NEEDED
Status: DISCONTINUED | OUTPATIENT
Start: 2025-05-27 | End: 2025-05-27

## 2025-05-27 RX ORDER — KETOROLAC TROMETHAMINE 15 MG/ML
15 INJECTION, SOLUTION INTRAMUSCULAR; INTRAVENOUS EVERY 6 HOURS
Status: COMPLETED | OUTPATIENT
Start: 2025-05-27 | End: 2025-05-28

## 2025-05-27 RX ORDER — PRENATAL VIT/IRON FUM/FOLIC AC 27MG-0.8MG
1 TABLET ORAL DAILY
Status: DISCONTINUED | OUTPATIENT
Start: 2025-05-27 | End: 2025-05-29 | Stop reason: HOSPADM

## 2025-05-27 RX ORDER — HYDROCODONE BITARTRATE AND ACETAMINOPHEN 5; 325 MG/1; MG/1
1 TABLET ORAL EVERY 4 HOURS PRN
Status: DISCONTINUED | OUTPATIENT
Start: 2025-05-27 | End: 2025-05-27 | Stop reason: HOSPADM

## 2025-05-27 RX ORDER — MISOPROSTOL 200 UG/1
800 TABLET ORAL AS NEEDED
Status: DISCONTINUED | OUTPATIENT
Start: 2025-05-27 | End: 2025-05-27 | Stop reason: HOSPADM

## 2025-05-27 RX ORDER — METHYLERGONOVINE MALEATE 0.2 MG/ML
200 INJECTION INTRAVENOUS ONCE AS NEEDED
Status: DISCONTINUED | OUTPATIENT
Start: 2025-05-27 | End: 2025-05-27 | Stop reason: HOSPADM

## 2025-05-27 RX ORDER — IBUPROFEN 600 MG/1
600 TABLET, FILM COATED ORAL EVERY 6 HOURS PRN
Status: DISCONTINUED | OUTPATIENT
Start: 2025-05-27 | End: 2025-05-27 | Stop reason: HOSPADM

## 2025-05-27 RX ORDER — OXYCODONE HYDROCHLORIDE 5 MG/1
5 TABLET ORAL EVERY 4 HOURS PRN
Status: DISCONTINUED | OUTPATIENT
Start: 2025-05-27 | End: 2025-05-29 | Stop reason: HOSPADM

## 2025-05-27 RX ORDER — CARBOPROST TROMETHAMINE 250 UG/ML
250 INJECTION, SOLUTION INTRAMUSCULAR AS NEEDED
Status: DISCONTINUED | OUTPATIENT
Start: 2025-05-27 | End: 2025-05-29 | Stop reason: HOSPADM

## 2025-05-27 RX ORDER — HYDROCORTISONE 25 MG/G
1 CREAM TOPICAL AS NEEDED
Status: DISCONTINUED | OUTPATIENT
Start: 2025-05-27 | End: 2025-05-29 | Stop reason: HOSPADM

## 2025-05-27 RX ORDER — DOCUSATE SODIUM 100 MG/1
100 CAPSULE, LIQUID FILLED ORAL 2 TIMES DAILY PRN
Status: DISCONTINUED | OUTPATIENT
Start: 2025-05-27 | End: 2025-05-29 | Stop reason: HOSPADM

## 2025-05-27 RX ORDER — ALUMINA, MAGNESIA, AND SIMETHICONE 2400; 2400; 240 MG/30ML; MG/30ML; MG/30ML
15 SUSPENSION ORAL EVERY 4 HOURS PRN
Status: DISCONTINUED | OUTPATIENT
Start: 2025-05-27 | End: 2025-05-29 | Stop reason: HOSPADM

## 2025-05-27 RX ORDER — MISOPROSTOL 200 UG/1
600 TABLET ORAL AS NEEDED
Status: DISCONTINUED | OUTPATIENT
Start: 2025-05-27 | End: 2025-05-29 | Stop reason: HOSPADM

## 2025-05-27 RX ORDER — PROMETHAZINE HYDROCHLORIDE 12.5 MG/1
12.5 SUPPOSITORY RECTAL EVERY 6 HOURS PRN
Status: DISCONTINUED | OUTPATIENT
Start: 2025-05-27 | End: 2025-05-29 | Stop reason: HOSPADM

## 2025-05-27 RX ORDER — IBUPROFEN 600 MG/1
600 TABLET, FILM COATED ORAL EVERY 6 HOURS
Status: DISCONTINUED | OUTPATIENT
Start: 2025-05-28 | End: 2025-05-29 | Stop reason: HOSPADM

## 2025-05-27 RX ORDER — CARBOPROST TROMETHAMINE 250 UG/ML
250 INJECTION, SOLUTION INTRAMUSCULAR AS NEEDED
Status: DISCONTINUED | OUTPATIENT
Start: 2025-05-27 | End: 2025-05-27 | Stop reason: HOSPADM

## 2025-05-27 RX ORDER — SODIUM CHLORIDE 0.9 % (FLUSH) 0.9 %
1-10 SYRINGE (ML) INJECTION AS NEEDED
Status: DISCONTINUED | OUTPATIENT
Start: 2025-05-27 | End: 2025-05-29 | Stop reason: HOSPADM

## 2025-05-27 RX ORDER — SODIUM CHLORIDE 9 MG/ML
40 INJECTION, SOLUTION INTRAVENOUS AS NEEDED
Status: DISCONTINUED | OUTPATIENT
Start: 2025-05-27 | End: 2025-05-29 | Stop reason: HOSPADM

## 2025-05-27 RX ORDER — OXYTOCIN/0.9 % SODIUM CHLORIDE 30/500 ML
2-20 PLASTIC BAG, INJECTION (ML) INTRAVENOUS
Status: DISCONTINUED | OUTPATIENT
Start: 2025-05-27 | End: 2025-05-27 | Stop reason: HOSPADM

## 2025-05-27 RX ORDER — OXYCODONE HYDROCHLORIDE 10 MG/1
10 TABLET ORAL EVERY 4 HOURS PRN
Status: DISCONTINUED | OUTPATIENT
Start: 2025-05-27 | End: 2025-05-29 | Stop reason: HOSPADM

## 2025-05-27 RX ORDER — ACETAMINOPHEN 325 MG/1
650 TABLET ORAL EVERY 6 HOURS
Status: DISCONTINUED | OUTPATIENT
Start: 2025-05-28 | End: 2025-05-29 | Stop reason: HOSPADM

## 2025-05-27 RX ORDER — SIMETHICONE 80 MG
80 TABLET,CHEWABLE ORAL 4 TIMES DAILY PRN
Status: DISCONTINUED | OUTPATIENT
Start: 2025-05-27 | End: 2025-05-29 | Stop reason: HOSPADM

## 2025-05-27 RX ORDER — LEVOTHYROXINE SODIUM 100 UG/1
100 TABLET ORAL DAILY
Status: DISCONTINUED | OUTPATIENT
Start: 2025-05-27 | End: 2025-05-29 | Stop reason: HOSPADM

## 2025-05-27 RX ORDER — OXYTOCIN/0.9 % SODIUM CHLORIDE 30/500 ML
125 PLASTIC BAG, INJECTION (ML) INTRAVENOUS ONCE AS NEEDED
Status: DISCONTINUED | OUTPATIENT
Start: 2025-05-27 | End: 2025-05-29 | Stop reason: HOSPADM

## 2025-05-27 RX ORDER — ACETAMINOPHEN 325 MG/1
650 TABLET ORAL EVERY 4 HOURS PRN
Status: DISCONTINUED | OUTPATIENT
Start: 2025-05-27 | End: 2025-05-27 | Stop reason: HOSPADM

## 2025-05-27 RX ORDER — OXYTOCIN/0.9 % SODIUM CHLORIDE 30/500 ML
999 PLASTIC BAG, INJECTION (ML) INTRAVENOUS ONCE
Status: DISCONTINUED | OUTPATIENT
Start: 2025-05-27 | End: 2025-05-29 | Stop reason: HOSPADM

## 2025-05-27 RX ORDER — METHYLERGONOVINE MALEATE 0.2 MG/ML
200 INJECTION INTRAVENOUS AS NEEDED
Status: DISCONTINUED | OUTPATIENT
Start: 2025-05-27 | End: 2025-05-29 | Stop reason: HOSPADM

## 2025-05-27 RX ORDER — ONDANSETRON 4 MG/1
4 TABLET, ORALLY DISINTEGRATING ORAL EVERY 8 HOURS PRN
Status: DISCONTINUED | OUTPATIENT
Start: 2025-05-27 | End: 2025-05-29 | Stop reason: HOSPADM

## 2025-05-27 RX ORDER — SODIUM CHLORIDE 0.9 % (FLUSH) 0.9 %
10 SYRINGE (ML) INJECTION EVERY 12 HOURS SCHEDULED
Status: DISCONTINUED | OUTPATIENT
Start: 2025-05-27 | End: 2025-05-29 | Stop reason: HOSPADM

## 2025-05-27 RX ORDER — CALCIUM CARBONATE 500 MG/1
1 TABLET, CHEWABLE ORAL EVERY 4 HOURS PRN
Status: DISCONTINUED | OUTPATIENT
Start: 2025-05-27 | End: 2025-05-29 | Stop reason: HOSPADM

## 2025-05-27 RX ORDER — OXYTOCIN/0.9 % SODIUM CHLORIDE 30/500 ML
250 PLASTIC BAG, INJECTION (ML) INTRAVENOUS CONTINUOUS
Status: ACTIVE | OUTPATIENT
Start: 2025-05-27 | End: 2025-05-27

## 2025-05-27 RX ORDER — KETOROLAC TROMETHAMINE 30 MG/ML
30 INJECTION, SOLUTION INTRAMUSCULAR; INTRAVENOUS ONCE AS NEEDED
Status: COMPLETED | OUTPATIENT
Start: 2025-05-27 | End: 2025-05-27

## 2025-05-27 RX ORDER — HYDROCODONE BITARTRATE AND ACETAMINOPHEN 10; 325 MG/1; MG/1
1 TABLET ORAL EVERY 4 HOURS PRN
Status: DISCONTINUED | OUTPATIENT
Start: 2025-05-27 | End: 2025-05-27 | Stop reason: HOSPADM

## 2025-05-27 RX ADMIN — KETOROLAC TROMETHAMINE 15 MG: 15 INJECTION, SOLUTION INTRAMUSCULAR; INTRAVENOUS at 18:54

## 2025-05-27 RX ADMIN — DOCUSATE SODIUM 100 MG: 100 CAPSULE, LIQUID FILLED ORAL at 21:54

## 2025-05-27 RX ADMIN — ACETAMINOPHEN 1000 MG: 500 TABLET ORAL at 21:55

## 2025-05-27 RX ADMIN — KETOROLAC TROMETHAMINE 30 MG: 30 INJECTION, SOLUTION INTRAMUSCULAR; INTRAVENOUS at 01:22

## 2025-05-27 RX ADMIN — PROMETHAZINE HYDROCHLORIDE 25 MG: 25 TABLET ORAL at 04:52

## 2025-05-27 RX ADMIN — ACETAMINOPHEN 1000 MG: 500 TABLET ORAL at 09:55

## 2025-05-27 RX ADMIN — KETOROLAC TROMETHAMINE 15 MG: 15 INJECTION, SOLUTION INTRAMUSCULAR; INTRAVENOUS at 06:38

## 2025-05-27 RX ADMIN — ACETAMINOPHEN 1000 MG: 500 TABLET ORAL at 16:08

## 2025-05-27 RX ADMIN — KETOROLAC TROMETHAMINE 15 MG: 15 INJECTION, SOLUTION INTRAMUSCULAR; INTRAVENOUS at 12:54

## 2025-05-27 RX ADMIN — PRENATAL VITAMINS-IRON FUMARATE 27 MG IRON-FOLIC ACID 0.8 MG TABLET 1 TABLET: at 09:55

## 2025-05-27 RX ADMIN — ACETAMINOPHEN 1000 MG: 500 TABLET ORAL at 04:13

## 2025-05-27 RX ADMIN — LEVOTHYROXINE SODIUM 100 MCG: 0.1 TABLET ORAL at 09:56

## 2025-05-27 NOTE — LACTATION NOTE
05/27/25 1402   Maternal Information   Date of Referral 05/27/25   Person Making Referral nurse  (assist with latching; infant fussy)   Maternal Reason for Referral breastfeeding currently  (infant fussy)   Maternal Infant Feeding   Maternal Emotional State receptive   Infant Positioning clutch/football;cradle;cross-cradle  (left football, left cradle, right football, left cross cradle; skin to skin)   Signs of Milk Transfer audible swallow;deep jaw excursions noted   Pain with Feeding no   Comfort Measures Before/During Feeding infant position adjusted  (to find comfortable position; skin to skin achieved and used pacifier and chin/cheek support to assist infant with sucking and coordination)   Latch Assistance verbal guidance offered;minimal assistance     Courtesy visit at request of patient RN assisting with latching.  Pumped colostrum provided by RN to assist.  Infant still fussy.  LC entered room.  Infant fussy.  Checked diaper, stool noted.  LC did diaper change.  Placed infant in L football hold, but infant not latching.  Placed infant skin to skin with mother and placed paci in mouth and rest of colostrum.  Provided chin/cheek support while suckling and infant calm.      Had mother place infant in L cradle hold, but infant not able to grasp and latch.  Had mother switch to R football hold and infant instantly latched.  Latched well for good solid 8 minutes and popped off.  Had mother burp infant.  Encouraged to offer L breast.  Had mother place infant in L cross cradle hold.  Infant had a wide gape response and latched.  Calm and nursing, audible swallow noted.  Mother encouraged.        Discussed supporting breast until infant latches on, then can release if suction not broken.  Encouraged to call lactation PRN.

## 2025-05-27 NOTE — OP NOTE
[facility]   Section Operative Note    Pre-Operative Dx:   1.  IUP at 39w0d  weeks     2. Prior   3.  Labor  4.  Multiparity, desires permanent sterilization        Postoperative dx:    1.  Same     Procedure: Procedure(s):   SECTION REPEAT, bilateral salpingectomy   Surgeon: Magui Gary MD    Assistant: Surgeon(s):  Magui Gary MD        Anesthesia: Spinal;Epidural   EBL:   mls.  <500ml mls.         IV Fluids: 800 mls.   UOP: 200 mls.       Antibiotics: cefazolin (Ancef)     Infant:            Gender: female infant    Weight: 3215 g (7 lb 1.4 oz)    Apgars: 8  @ 1 minute /     9  @ 5 minutes    Fetal presentation: cephalic   Amniotic fluid: Meconium      Complications:   None      Disposition:   Mother to Mother Baby/Postpartum  in stable condition currently.   Baby to NBN  in stable condition currently.       Procedure:   The patient was taken to the operating room where general anesthesia was dosed and she was placed in supine position with a leftward tilt. Sequential compression devices on bilateral lower extremities and a Giraldo catheter placed in her bladder. After being prepped and draped in a normal sterile fashion, an elliptical skin incision was made with a scalpel around prior keloid scar and scar taken out.  The incision was then taken taken down to the level of the fascia using the Bovie. The fascia was incised in the midline and extended laterally. The superior edge of the fascia was grasped with Kocher clamps, elevated and the rectus muscle dissected off. In a similar fashion, the inferior edge of the fascia was grasped with Kocher clamps, elevated and the rectus muscles dissected off. The rectus muscles were bisected in the midline. The peritoneum was identified, grasped and entered into sharply using Metzenbaum scissors. This incision was extended superiorly and inferiorly with good visualization of the bladder. Bladder blade was placed. A bladder flap  was created. A low transverse uterine incision was made with a scalpel and extended laterally. Amniotomy with clear fluid occurred at the time of hysterotomy. The head was brought to the uterine incision, and using fundal pressure, the head delivered without complication. Nose and mouth were bulb suctioned.  Shoulders and body were to follow.  1 minute the cord was clamped x2 and cut. Infant was handed to the awaiting pediatric team.  Cord blood was obtained.  True knot in the cord was noted.  The placenta was manually extracted. The uterus was exteriorized and cleared of all clot and debris and the hysterotomy site was closed with a 1-0 chromic in a running locked fashion starting at the left angle and moving towards the right. Copious irrigation behind the uterus ensued.  The Enseal device was then used to transect across the mesosalpinx of the right tube to 1 cm from the cornua which was then used with the LigaSure to amputate the right tube.  This was repeated on the left until bilateral salpingectomy was completed.  The uterus was returned to the abdominal cavity. Paracolic gutters were cleared of all clot and debris. The hysterotomy site was noted to be hemostatic as well as the bladder flap and Interceed was placed over this. The peritoneum was reapproximated using a 2-0 chromic in a running fashion starting superiorly and moving inferiorly. Irrigation over the rectus muscles then occurred with Bovie cauterization of any bleeding sites. The fascia was reapproximated using a looped 0 PDS in a running fashion starting at the left angle and moving towards the right.  The subcutaneous fat layer was hemostatic and closed in an interrupted fashion with 2-0 Plain.  An incision was reapproximated using a stapler.  All sponge, lap, and needle counts were correct x2. The patient was taken to the recovery room in stable condition.             Magui Gary MD  5/26/2025  23:25 EDT

## 2025-05-27 NOTE — PROGRESS NOTES
"Progress Note    Patient name: Denia Gage  YOB: 1985   MRN: [id number]  Admission Date: 2025  Date of Service: 2025  Referring: Angel Moran MD    ID: 39 y.o.     Post Op Day: 1 Day Post-Op    Diagnosis:   S/p  delivery    Normal labor    Pregnancy    Delivery of pregnancy by  section       Subjective:      No complaints.  Normal lochia.  Catheter in place. Ambulating, tolerating diet.  Pain well controlled.  The patient is currently breastfeeding.  This baby is a female.    Objective:      Temp:  [97.7 °F (36.5 °C)-98.4 °F (36.9 °C)] 98.1 °F (36.7 °C)  Heart Rate:  [52-94] 75  Resp:  [18] 18  BP: ()/(50-94) 116/70    Medications:  Scheduled Meds:acetaminophen, 1,000 mg, Oral, Q6H   Followed by  [START ON 2025] acetaminophen, 650 mg, Oral, Q6H  ketorolac, 15 mg, Intravenous, Q6H   Followed by  [START ON 2025] ibuprofen, 600 mg, Oral, Q6H  levothyroxine, 100 mcg, Oral, Daily  oxytocin, 999 mL/hr, Intravenous, Once  prenatal vitamin, 1 tablet, Oral, Daily  sodium chloride, 1,000 mL, Intravenous, Once  sodium chloride, 10 mL, Intravenous, Q12H      Continuous Infusions:   PRN Meds:.  aluminum-magnesium hydroxide-simethicone **OR** calcium carbonate    carboprost    docusate sodium    Hydrocortisone (Perianal)    lanolin    methylergonovine    miSOPROStol    ondansetron ODT    oxyCODONE **OR** oxyCODONE    oxytocin    promethazine **OR** promethazine    simethicone    sodium chloride    sodium chloride     Exam:  Abdomen: soft, nontender  Uterus: nontender, firm  Incision: Deferred. Dressing in place.   Extremities: nontender; no edema   Catheter with good output. Clear urine noted.     Labs:  Lab Results   Component Value Date    HGB 11.6 (L) 2025     No results found for: \"ABORH\"    Assessment/Plan:      POD#1 Day Post-Op s/p repeat  deliver with bilateral salpingectomy. Doing well.  Catheter in place.       Continue " routine postoperative care.Advance.   Remove catheter per protocol     Edyta Silva, APRN  5/27/2025

## 2025-05-27 NOTE — LACTATION NOTE
25 0918   Maternal Information   Date of Referral 25   Person Making Referral lactation consultant  (courtesy; newly postpartum)   Maternal Reason for Referral other (see comments)  (2nd time mother; 1st infant in NICU, was 1 month early, pumped for a few months; desires to nurse current infant; mother also has Graves)   Infant Reason for Referral  infant   Maternal Assessment   Breast Size Issue none   Breast Shape Bilateral:;round   Breast Density Bilateral:;soft   Nipples Bilateral:;everted   Left Nipple Symptoms intact;nontender   Right Nipple Symptoms intact;nontender   Maternal Infant Feeding   Maternal Emotional State receptive;relaxed   Infant Positioning clutch/football  (right; infant nursed prior with the assistance of RN; offered latch assistance to evaluate and mother accepted)   Signs of Milk Transfer audible swallow;deep jaw excursions noted   Pain with Feeding no   Comfort Measures Before/During Feeding latch adjusted  (to achieve deeper latching)   Latch Assistance verbal guidance offered;minimal assistance   Support Person Involvement verbally supports mother   Milk Expression/Equipment   Breast Pump Type double electric, personal;manual pump  (has Spectra; provided manual pump with 18mm flanges)   Breast Pump Flange Type hard   Breast Pump Flange Size other (see comments)  (18mm; discussed tightest comfortable fit)   Equipment for Home Use pump not needed at this time   Breast Pumping   Breast Pumping Interventions other (see comments)  (for short/missed feedings, if infant not feeding well after 12 hours, due to having Graves to stimulate/encourage milk production)   Breast Pumping manual breast pump utilized   Lactation Referrals   Lactation Referrals outpatient lactation program   Outpatient Lactation Program Lactation Follow-up Date/Time prn     Courtesy with newly postpartum couplet.  Patient RN assisted with latching infant prior to LC visit.    Offered latch  assistance to evaluate latch and mother accepted.  Had mother place infant in R football hold.  Had mother place nipple to infant nose tip and rest bottom of breast onto infant chin and infant presented with a wide gape response and latched.  Audible swallow noted.  Infant nursed for another good 8 minutes.      Discussed breastfeeding tips.  Encouraged to nurse every three hours along with feeding cues seen and skin to skin.  Provided mother with a manual pump while her personal pump is brought in and encouraged to pump post feeds to assist with stimulation/encouragement of milk production due to having Graves/thyroidectomy.  Mother verbalized understanding.    To call lactation PRN.

## 2025-05-27 NOTE — ANESTHESIA POSTPROCEDURE EVALUATION
Patient: Denia Gage    Procedure Summary       Date: 25 Room / Location: [facility] LABOR DELIVERY 2 /  [facility] LABOR DELIVERY    Anesthesia Start:  Anesthesia Stop:     Procedure:  SECTION REPEAT (Abdomen) Diagnosis:     Surgeons: Magui Gary MD Provider: Rory Morelos DO    Anesthesia Type: epidural ASA Status: 2            Anesthesia Type: epidural    Vitals  Vitals Value Taken Time   /71 25 23:28   Temp 97.7 °F 25 23:28   Pulse 82 25 23:28   Resp 16    SpO2 94 %            Post Anesthesia Care and Evaluation    Patient location during evaluation: bedside  Patient participation: complete - patient participated  Level of consciousness: awake  Pain score: 0  Pain management: satisfactory to patient    Airway patency: patent  Anesthetic complications: No anesthetic complications  PONV Status: none  Cardiovascular status: acceptable and hemodynamically stable  Respiratory status: acceptable  Hydration status: acceptable

## 2025-05-27 NOTE — ANESTHESIA POSTPROCEDURE EVALUATION
Patient: Denia Gage    Procedure Summary       Date: 25 Room / Location: [facility] LABOR DELIVERY 2 /  [facility] LABOR DELIVERY    Anesthesia Start:  Anesthesia Stop:     Procedure:  SECTION REPEAT (Abdomen) Diagnosis:     Surgeons: Magui Gary MD Provider: Rory Morelos DO    Anesthesia Type: epidural ASA Status: 2            Anesthesia Type: epidural    Vitals  Vitals Value Taken Time   /70 25 08:35   Temp 98.1 °F (36.7 °C) 25 08:35   Pulse 75 25 08:35   Resp 18 25 08:35   SpO2 96 % 25 00:28           Post Anesthesia Care and Evaluation    Patient location during evaluation: bedside  Patient participation: complete - patient participated  Level of consciousness: awake and alert  Pain management: adequate    Airway patency: patent  Anesthetic complications: No anesthetic complications    Cardiovascular status: acceptable  Respiratory status: acceptable  Hydration status: acceptable  Post Neuraxial Block status: Motor and sensory function returned to baseline and No signs or symptoms of PDPH

## 2025-05-28 PROCEDURE — 25010000002 KETOROLAC TROMETHAMINE PER 15 MG: Performed by: OBSTETRICS & GYNECOLOGY

## 2025-05-28 PROCEDURE — 0503F POSTPARTUM CARE VISIT: CPT | Performed by: NURSE PRACTITIONER

## 2025-05-28 RX ORDER — POLYETHYLENE GLYCOL 3350 17 G/17G
17 POWDER, FOR SOLUTION ORAL DAILY
Status: DISCONTINUED | OUTPATIENT
Start: 2025-05-29 | End: 2025-05-29 | Stop reason: HOSPADM

## 2025-05-28 RX ADMIN — IBUPROFEN 600 MG: 600 TABLET ORAL at 13:56

## 2025-05-28 RX ADMIN — ACETAMINOPHEN 650 MG: 325 TABLET ORAL at 09:58

## 2025-05-28 RX ADMIN — LEVOTHYROXINE SODIUM 100 MCG: 0.1 TABLET ORAL at 06:30

## 2025-05-28 RX ADMIN — SIMETHICONE 80 MG: 80 TABLET, CHEWABLE ORAL at 22:39

## 2025-05-28 RX ADMIN — KETOROLAC TROMETHAMINE 15 MG: 15 INJECTION, SOLUTION INTRAMUSCULAR; INTRAVENOUS at 01:28

## 2025-05-28 RX ADMIN — ACETAMINOPHEN 650 MG: 325 TABLET ORAL at 04:19

## 2025-05-28 RX ADMIN — IBUPROFEN 600 MG: 600 TABLET ORAL at 19:01

## 2025-05-28 RX ADMIN — ACETAMINOPHEN 650 MG: 325 TABLET ORAL at 22:38

## 2025-05-28 RX ADMIN — SIMETHICONE 80 MG: 80 TABLET, CHEWABLE ORAL at 08:03

## 2025-05-28 RX ADMIN — DOCUSATE SODIUM 100 MG: 100 CAPSULE, LIQUID FILLED ORAL at 22:38

## 2025-05-28 RX ADMIN — IBUPROFEN 600 MG: 600 TABLET ORAL at 06:29

## 2025-05-28 RX ADMIN — ACETAMINOPHEN 650 MG: 325 TABLET ORAL at 16:05

## 2025-05-28 RX ADMIN — DOCUSATE SODIUM 100 MG: 100 CAPSULE, LIQUID FILLED ORAL at 08:03

## 2025-05-28 RX ADMIN — PRENATAL VITAMINS-IRON FUMARATE 27 MG IRON-FOLIC ACID 0.8 MG TABLET 1 TABLET: at 08:03

## 2025-05-28 NOTE — PROGRESS NOTES
Postpartum Progress Note    Patient name: Denia Gage  YOB: 1985   MRN: [id number]  Referring Provider: Agnel Moran MD  Admission Date: 2025  Date of Service: 2025    ID: 39 y.o.     Diagnosis:   S/p  delivery 2 Days Post-Op     Normal labor    Pregnancy    Delivery of pregnancy by  section       Subjective:      No complaints.  Moderate lochia.  Ambulating, voiding, tolerating diet.  Pain well controlled.  The patient is currently breastfeeding.   This baby is a female    Objective:      Vital signs:  Vital Signs Range for the last 24 hours  Temperature: Temp:  [97.9 °F (36.6 °C)-98.4 °F (36.9 °C)] 98.2 °F (36.8 °C)   Temp Source: Temp src: Oral   BP: BP: (118-136)/(69-83) 136/83   Pulse: Heart Rate:  [68-91] 84   Respirations: Resp:  [16-18] 18   Weight: 71.7 kg (158 lb)     General: Alert & oriented x4, in no apparent distress  Abdomen: soft, nontender  Uterus: firm, nontender  Incision: clean, dry, intact  Extremities: nontender; no edema      Labs:  Lab Results   Component Value Date    WBC 14.03 (H) 2025    HGB 11.6 (L) 2025    HCT 34.7 2025    MCV 90.1 2025     2025     Results from last 7 days   Lab Units 25  1434   ABO TYPING  A   RH TYPING  Positive     External Prenatal Results       Pregnancy Outside Results - Transcribed From Office Records - See Scanned Records For Details       Test Value Date Time    ABO  A  25 1434    Rh  Positive  25 1434    Antibody Screen  Negative  25 1434       Negative  25 1341       Negative  10/30/24 0857    Varicella IgG       Rubella  0.90 index 10/30/24 0857    Hgb  11.6 g/dL 25 0456       13.2 g/dL 25 1434       11.9 g/dL 25 1341       12.7 g/dL 25 1610       13.7 g/dL 10/30/24 0857    Hct  34.7 % 25 0456       38.1 % 25 1434       35.4 % 25 1341       38.3 % 25 1610       44.2 %  10/30/24 0857    HgB A1c        1h GTT  77 mg/dL 25 1341    3h GTT Fasting       3h GTT 1 hour       3h GTT 2 hour       3h GTT 3 hour        Gonorrhea (discrete)  Negative  10/30/24 0857    Chlamydia (discrete)  Negative  10/30/24 0857    RPR  Non Reactive  25 1341       Non Reactive  10/30/24 0857    Syphils cascade: TP-Ab (FTA)  Non-Reactive  25 1434    TP-Ab  Non-Reactive  25 1434    TP-Ab (EIA)       TPPA       HBsAg  Negative  10/30/24 0857    Herpes Simplex Virus PCR       Herpes Simplex VIrus Culture       HIV  Non Reactive  10/30/24 0857    Hep C RNA Quant PCR       Hep C Antibody  Non Reactive  10/30/24 0857    AFP       NIPT       Cystic Fibrosis (James)       Cystic Fibroisis        Spinal Muscular atrophy       Fragile X       Group B Strep  No Group B Streptococcus isolated  25 1802    GBS Susceptibility to Clindamycin       GBS Susceptibility to Erythromycin       Fetal Fibronectin       Genetic Testing, Maternal Blood                 Drug Screening       Test Value Date Time    Urine Drug Screen       Amphetamine Screen  Negative ng/mL 10/30/24 0857    Barbiturate Screen  Negative ng/mL 10/30/24 0857    Benzodiazepine Screen  Negative ng/mL 10/30/24 0857    Methadone Screen  Negative ng/mL 10/30/24 0857    Phencyclidine Screen  Negative ng/mL 10/30/24 0857    Opiates Screen       THC Screen       Cocaine Screen       Propoxyphene Screen  Negative ng/mL 10/30/24 0857    Buprenorphine Screen       Methamphetamine Screen       Oxycodone Screen       Tricyclic Antidepressants Screen                 Legend    ^: Historical                            Assessment/Plan:      2 Days Post-Op s/p Procedure(s):   SECTION REPEAT  1. S/p  delivery: Continue postoperative care.  Doing well.  2. Infant feeding: Supportive care.  The patient is currently breastfeeding.  .

## 2025-05-28 NOTE — PLAN OF CARE
Goal Outcome Evaluation:              Outcome Evaluation: VSS, voids, passing flatus, breastfeeding with assistance,ambulatory, Inc. has dressing on, told her we need it off by tomorrow afternoon. FF U/1

## 2025-05-28 NOTE — PLAN OF CARE
Goal Outcome Evaluation:  Plan of Care Reviewed With: patient        Progress: improving  Outcome Evaluation: VSS. voiding. passing flatus, requests miralax for a BM. Breastfeeding. abdominal dressing removed; incision intact with staples

## 2025-05-28 NOTE — LACTATION NOTE
05/28/25 0807   Maternal Information   Date of Referral 05/28/25   Person Making Referral lactation consultant  (courtesy check in)   Maternal Reason for Referral breastfeeding currently   Maternal Assessment   Breast Size Issue none   Breast Shape Bilateral:;round   Breast Density Bilateral:;soft   Nipples Bilateral:;everted   Left Nipple Symptoms intact;nontender   Right Nipple Symptoms intact;nontender   Maternal Infant Feeding   Maternal Emotional State receptive;anxious   Infant Positioning cross-cradle  (left breast)   Signs of Milk Transfer none noted   Pain with Feeding no   Comfort Measures Before/During Feeding   (stimulation throughout feeding encouraged.)   Latch Assistance other (see comments)  (assisted by MB RN)   Support Person Involvement actively supporting mother;verbally supports mother

## 2025-05-29 VITALS
RESPIRATION RATE: 16 BRPM | HEIGHT: 67 IN | WEIGHT: 158 LBS | SYSTOLIC BLOOD PRESSURE: 131 MMHG | OXYGEN SATURATION: 96 % | TEMPERATURE: 98.3 F | DIASTOLIC BLOOD PRESSURE: 84 MMHG | BODY MASS INDEX: 24.8 KG/M2 | HEART RATE: 85 BPM

## 2025-05-29 PROCEDURE — 0503F POSTPARTUM CARE VISIT: CPT | Performed by: NURSE PRACTITIONER

## 2025-05-29 PROCEDURE — 90707 MMR VACCINE SC: CPT | Performed by: NURSE PRACTITIONER

## 2025-05-29 PROCEDURE — 90471 IMMUNIZATION ADMIN: CPT | Performed by: NURSE PRACTITIONER

## 2025-05-29 PROCEDURE — 25010000002 MEASLES, MUMPS & RUBELLA VAC RECONSTITUTED SOLUTION: Performed by: NURSE PRACTITIONER

## 2025-05-29 RX ORDER — OXYCODONE HYDROCHLORIDE 5 MG/1
5 TABLET ORAL EVERY 6 HOURS PRN
Qty: 12 TABLET | Refills: 0 | Status: SHIPPED | OUTPATIENT
Start: 2025-05-29 | End: 2025-06-01

## 2025-05-29 RX ORDER — DIAPER,BRIEF,INFANT-TODD,DISP
1 EACH MISCELLANEOUS 4 TIMES DAILY PRN
Qty: 28.4 G | Refills: 6 | Status: SHIPPED | OUTPATIENT
Start: 2025-05-29

## 2025-05-29 RX ORDER — IBUPROFEN 600 MG/1
600 TABLET, FILM COATED ORAL EVERY 6 HOURS
Qty: 60 TABLET | Refills: 0 | Status: SHIPPED | OUTPATIENT
Start: 2025-05-29

## 2025-05-29 RX ADMIN — SIMETHICONE 80 MG: 80 TABLET, CHEWABLE ORAL at 07:58

## 2025-05-29 RX ADMIN — POLYETHYLENE GLYCOL 3350 17 G: 17 POWDER, FOR SOLUTION ORAL at 01:15

## 2025-05-29 RX ADMIN — ACETAMINOPHEN 650 MG: 325 TABLET ORAL at 09:27

## 2025-05-29 RX ADMIN — DOCUSATE SODIUM 100 MG: 100 CAPSULE, LIQUID FILLED ORAL at 07:58

## 2025-05-29 RX ADMIN — MEASLES, MUMPS, AND RUBELLA VIRUS VACCINE LIVE 0.5 ML: 1000; 12500; 1000 INJECTION, POWDER, LYOPHILIZED, FOR SUSPENSION SUBCUTANEOUS at 10:48

## 2025-05-29 RX ADMIN — IBUPROFEN 600 MG: 600 TABLET ORAL at 07:58

## 2025-05-29 RX ADMIN — IBUPROFEN 600 MG: 600 TABLET ORAL at 01:15

## 2025-05-29 RX ADMIN — PRENATAL VITAMINS-IRON FUMARATE 27 MG IRON-FOLIC ACID 0.8 MG TABLET 1 TABLET: at 07:58

## 2025-05-29 RX ADMIN — ACETAMINOPHEN 650 MG: 325 TABLET ORAL at 04:47

## 2025-05-29 RX ADMIN — LEVOTHYROXINE SODIUM 100 MCG: 0.1 TABLET ORAL at 05:48

## 2025-05-29 NOTE — LACTATION NOTE
Mom called out earlier for breastfeeding questions and latch check.  Infant done breastfeeding when lactation entered room.  MB RN had checked latch and states mom in independent in latching.  Mom requesting assistance with spectra pump.  Directed her to breast pump use video and instructed her on finding flanges that fit correctly.  Encouraged flange insert kit with variety of sizes.      Mom asked if she will be able to see lactation in AM prior to discharge.  Encouraged her to call for lactation when she was ready and they would get to her as soon as possible.      Mom requesting appointment with outpatient clinic.  Encouraged her to call to schedule outpatient lactation clinic once home and has doctors appointment scheduled.  Clinic prefers to see infants after 1 week of age unless struggling with weight gain.  Mom states understanding.

## 2025-05-29 NOTE — LACTATION NOTE
05/29/25 1016   Maternal Information   Date of Referral 05/29/25   Person Making Referral nurse;patient  (requesting lactation prior to discharge)   Maternal Reason for Referral other (see comments)  (mother worried/concerned infant not waking for feeds; encouraged it is normal behavior for infants and that infant is transferring well r/t good weight loss and wet/dirty diapers; offered latch assistance, mother accepted)   Maternal Assessment   Nipples Bilateral:;everted  (using nipple shield if needs)   Left Nipple Symptoms intact;nontender   Right Nipple Symptoms intact;nontender   Maternal Infant Feeding   Maternal Emotional State receptive;anxious   Infant Positioning clutch/football  (right; encouraged taking infant down to diaper; mother stated infant did have a feeding session of 15/15 prior to LC entering room; LC wanted to observe positioning)   Pain with Feeding no  (with prior feedings)   Comfort Measures Before/During Feeding infant position adjusted  (infant belly facing ceiling when mother placed infant in football hold; encouraged turning infant on side and belly to belly with mother; mother adjusted; encouraged infant nose to nipple.  Infant not intersted at this time.)   Latch Assistance minimal assistance;verbal guidance offered   Support Person Involvement actively supporting mother;verbally supports mother   Breast Pumping   Breast Pumping Interventions other (see comments)  (encouraged pumping for missed feeds or short feeds; encouraged to still nurse infant every three hours along with any feeding cues seen; discussed pacifier use with masking feeding cues; parents verbalized understanding.)     Lactation at mother's request.  Mother concerned infant too sleepy to nurse.  Infant had just nursed prior to LC entering room.  Mother stated infant nursed well for 15/15.  Encouraged to work with infant for a good solid 10 minutes and doing lots of skin to skin as well when at home.    Encouraged  "placing infant's belly to belly with her when positioning infant.  Encouraged pumping post feeds.  Discussed flange sizing and insert kit for home pump.  Answered questions.  Offered latch assistance and mother accepted.  Mother placed infant in R football hold with belly to ceiling.  Encouraged belly to belly.  Mother adjusted.  Infant not interested at this time.  Referred parents to postpartum booklet breastfeeding pages for further information.    LC exited room for supplies and when returned, mother had infant in R football hold and infant nursing well, audible swallowing noted, good positioning.  Encouraged mother she is doing a great job.  Mother encouraged and LC commented, \"You got this momma.\"  To call lactation PRN.  "

## 2025-05-29 NOTE — DISCHARGE SUMMARY
Discharge Summary    Date of Admission: 2025  Date of Discharge:  2025      Patient: Denia Gage      MR#:[id number]    Primary Surgeon/OB: Magui Gary MD    Discharge Surgeon/OB:    Presenting Problem/History of Present Illness  Normal labor [O80, Z37.9]  Pregnancy [Z34.90]       Normal labor    Pregnancy    Delivery of pregnancy by  section        Discharge Diagnosis:  section at 39w0d    Procedures:  , Low Transverse    2025   10:42 PM     Feeding method: Breastfeeding Status: Yes    Rh Immune globulin given: not applicable    Rubella vaccine given: not applicable    Discharge Date: 2025; Discharge Time: 09:08 EDT    Early Discharge:  NO    Hospital Course  Patient is a 39 y.o. female  at 39w0d status post  section with uneventful postoperative recovery.  Patient was advanced to regular diet on postoperative day#1.  On discharge, ambulating, tolerating a regular diet without any difficulties and her incision is dry, clean and intact.     Infant:   female fetus 3215 g (7 lb 1.4 oz) with Apgar scores of 8 , 9  at five minutes.      Condition on Discharge:  Stable    Vital Signs  Temp:  [97.7 °F (36.5 °C)-98.3 °F (36.8 °C)] 98.3 °F (36.8 °C)  Heart Rate:  [] 85  Resp:  [16-18] 16  BP: (115-131)/(74-84) 131/84    Lab Results   Component Value Date    WBC 14.03 (H) 2025    HGB 11.6 (L) 2025    HCT 34.7 2025    MCV 90.1 2025     2025       Discharge Disposition  Home or Self Care    Discharge Medications     Discharge Medications        New Medications        Instructions Start Date   hydrocortisone 1 % ointment   1 Application, Topical, 4 Times Daily PRN      ibuprofen 600 MG tablet  Commonly known as: ADVIL,MOTRIN   600 mg, Oral, Every 6 Hours      oxyCODONE 5 MG immediate release tablet  Commonly known as: ROXICODONE   5 mg, Oral, Every 6 Hours PRN             Continue These Medications         Instructions Start Date   PRENATAL VITAMINS PO   Take  by mouth.             Stop These Medications      aspirin 81 MG chewable tablet     levothyroxine 100 MCG tablet  Commonly known as: SYNTHROID, LEVOTHROID     levothyroxine 75 MCG tablet  Commonly known as: Synthroid     nitrofurantoin (macrocrystal-monohydrate) 100 MG capsule  Commonly known as: Macrobid              Discharge Diet:     Activity at Discharge:   Activity Instructions       Activity as Tolerated      Driving Restrictions      Type of Restriction: Driving    Driving Restrictions: No Driving (Time Limited)    Length: 2 Weeks    Gradually Increase Activity Until at Pre-Hospitalization Level      Lifting Restrictions      Type of Restriction: Lifting    Lifting Restrictions: Lifting Restriction (Indicate Limit)    Weight Limit (Pounds): 15    Length of Lifting Restriction: 4-6 weeks    Pelvic Rest      Sexual Activity Restrictions      Type of Restriction: Sex    Explain Sexual Activity Restrictions: Nothing in the vagina for 6 weeks    Work Restrictions      Type of Restriction: Work    May Return to Work: Other    Return to Work Instructions: May go back to work after 6 weeks            Follow-up Appointments  Future Appointments   Date Time Provider Department Center   6/10/2025  2:30 PM Alva Abrams APRN [facility]  [facility]   7/9/2025  9:40 AM Angel Moran MD [facility]   8/20/2025 10:00 AM Florina Hull DO [facility]     Additional Instructions for the Follow-ups that You Need to Schedule       Call MD With Problems / Concerns   As directed      Instructions: Call for temp >/= 100.4, severe pain, severe bleeding, headache that does not improve with rest, medication, blurred vision, or postpartum depression. Monitor incision for signs of infection including, foul odor, discharge or separation of the wound.  Call for blood clots larger than a golf ball or saturating a pad in less than an hour.    Order Comments:  Instructions: Call for temp >/= 100.4, severe pain, severe bleeding, headache that does not improve with rest, medication, blurred vision, or postpartum depression. Monitor incision for signs of infection including, foul odor, discharge or separation of the wound.  Call for blood clots larger than a golf ball or saturating a pad in less than an hour.         Discharge Follow-up with Specified Provider: For an incision check; 2 Weeks   As directed      To: For an incision check   Follow Up: 2 Weeks                Ashley Tom, ZAHRA  05/29/25  09:08 EDT

## 2025-05-30 ENCOUNTER — MATERNAL SCREENING (OUTPATIENT)
Dept: CALL CENTER | Facility: HOSPITAL | Age: 40
End: 2025-05-30
Payer: COMMERCIAL

## 2025-05-30 NOTE — OUTREACH NOTE
Maternal Screening Survey      Flowsheet Row Responses   Eligibility Eligible   Prep survey completed? Yes   Facility patient discharged from? [facility] Registered [facility]

## 2025-06-02 ENCOUNTER — TELEPHONE (OUTPATIENT)
Dept: OBSTETRICS AND GYNECOLOGY | Facility: CLINIC | Age: 40
End: 2025-06-02
Payer: COMMERCIAL

## 2025-06-02 DIAGNOSIS — L23.5 ALLERGIC DERMATITIS DUE TO OTHER CHEMICAL PRODUCT: Primary | ICD-10-CM

## 2025-06-02 RX ORDER — TRIAMCINOLONE ACETONIDE 1 MG/G
OINTMENT TOPICAL
Qty: 80 G | Refills: 0 | Status: SHIPPED | OUTPATIENT
Start: 2025-06-02

## 2025-06-02 NOTE — TELEPHONE ENCOUNTER
Pt states she has a very itchy rash from her upper stomach to thigh creases, extending to midaxillary line bilaterally. Small reddened bumps. She has tried hydrocortisone 2.5% twice daily, but it isn't fully resolving the itch. Discussed this is consistent with an allergy to the chlorhexadine prep, and we can send in triamcinolone 0.1% ointment to use twice daily. She can also try pepcid if needed for itching, as it is less likely to impact milk supply. If symptoms worsen or do not improve in next few days, she may need to be seen.

## 2025-06-02 NOTE — TELEPHONE ENCOUNTER
Caller: Denia Gage    Relationship: Self    Best call back number: [phone]    What is the best time to reach you: ANY    Who are you requesting to speak with (clinical staff, provider,  specific staff member): CLINICAL     What was the call regarding: PT IS DR SANABRIA PT BUT HAD C SECTION WITH DR CUTLER 5/26, PT STATES SHE HAS A ITCHY RASH SPREADING ALL OVER HER STOMACH NEAR INCISION; UNABLE TO WT

## 2025-06-03 ENCOUNTER — POSTPARTUM VISIT (OUTPATIENT)
Dept: OBSTETRICS AND GYNECOLOGY | Facility: CLINIC | Age: 40
End: 2025-06-03
Payer: COMMERCIAL

## 2025-06-03 ENCOUNTER — TELEPHONE (OUTPATIENT)
Dept: OBSTETRICS AND GYNECOLOGY | Facility: CLINIC | Age: 40
End: 2025-06-03
Payer: COMMERCIAL

## 2025-06-03 VITALS
BODY MASS INDEX: 22.41 KG/M2 | SYSTOLIC BLOOD PRESSURE: 116 MMHG | WEIGHT: 142.8 LBS | HEIGHT: 67 IN | DIASTOLIC BLOOD PRESSURE: 68 MMHG

## 2025-06-03 DIAGNOSIS — Z98.891 S/P C-SECTION: ICD-10-CM

## 2025-06-03 DIAGNOSIS — L23.9 ALLERGIC CONTACT DERMATITIS, UNSPECIFIED TRIGGER: ICD-10-CM

## 2025-06-03 RX ORDER — FLUCONAZOLE 150 MG/1
TABLET ORAL
Qty: 2 TABLET | Refills: 0 | Status: SHIPPED | OUTPATIENT
Start: 2025-06-03

## 2025-06-03 RX ORDER — ACETAMINOPHEN 500 MG
500 TABLET ORAL EVERY 6 HOURS PRN
COMMUNITY

## 2025-06-03 NOTE — TELEPHONE ENCOUNTER
Patient of Dr. Moran; had RCS 05/26/25. NOV 06/10/25.  See phone encounter notes from yesterday.  Returned patient's call.   States she used the new topical cream and Pepcid and rash on her abdomen is slightly better.  Reports yesterday she began having vaginal itching; is improved but not resolved today.  Denies any unusual vaginal discharge.   She is requesting a medication to treat a potential yeast infection.  Discussed with ZAHRA Dudley. She states patient would need to be seen; would not treat without evaluating first.  Informed patient. Appointment scheduled per patient request.

## 2025-06-03 NOTE — TELEPHONE ENCOUNTER
Hub staff attempted to follow warm transfer process and was unsuccessful     Caller: Denia Gage    Relationship to patient: Self    Best call back number: [phone]    Patient is needing: PT DELIVERED ON 05/26/25 - PT IS EXPERIENCING SOME VAGINAL ITCHING AND WOULD LIKE TO HAVE MEDICATION SENT TO HER [facility] TO PREVENT A FULL YEAST INFECTION     PLEASE CALL THE PT TO DISCUSS AND CONFIRM REQUEST    THANK YOU!

## 2025-06-03 NOTE — PROGRESS NOTES
"      Chief Complaint   Patient presents with    Postpartum Care       Postpartum problems visit       Denia Gage is a 39 y.o.  who is s/p , Low Transverse   Information for the patient's :  Sophy Gage [[id number]]   2025   female   Sophy Gage   3215 g (7 lb 1.4 oz)   Gestational Age: 39w0d     Baby Discharged: Discharged with Mom  Delivering Physician: Magui Gary MD    She presents today for vaginal itching, rash on lower abdomen, back, and vulvar. She was using hydrocortisone ointment with no improvement.  Triamcinolone ointment and it has been helping.     Patient describes bleeding as light.  Patient is breast feeding.  reports bowel or bladder issues.    Patient denies postpartum depression. Postpartum Depression Screening Questionnaire: 4, no treatment indicated.    The additional following portions of the patient's history were reviewed and updated as appropriate: allergies and current medications.      Review of Systems    I have reviewed and agree with the HPI, ROS, and historical information as entered above. Francia Staley, APRN      Objective   /68 (BP Location: Right arm, Patient Position: Sitting, Cuff Size: Adult)   Ht 170.2 cm (67.01\")   Wt 64.8 kg (142 lb 12.8 oz)   LMP 2024   Breastfeeding Yes   BMI 22.36 kg/m²     Physical Exam  Vitals and nursing note reviewed.   Constitutional:       General: She is not in acute distress.     Appearance: Normal appearance. She is normal weight. She is not ill-appearing.   Pulmonary:      Effort: Pulmonary effort is normal. No respiratory distress.   Abdominal:      General: There is no distension.      Palpations: Abdomen is soft. There is no mass.      Tenderness: There is abdominal tenderness. There is no guarding or rebound.      Hernia: No hernia is present.      Comments: Steristrips intact.  Incision well-approximated without redness or drainage   Skin:     General: Skin is warm " and dry.   Neurological:      Mental Status: She is alert and oriented to person, place, and time.   Psychiatric:         Mood and Affect: Mood normal.         Behavior: Behavior normal.              Assessment and Plan    Problem List Items Addressed This Visit    None  Visit Diagnoses         Postpartum follow-up    -  Primary      Allergic contact dermatitis, unspecified trigger          S/P                 S/p , 8 days postpartum.  Doing well.    Continued pelvic rest.   Contact reaction to adhesive/wash.  Diflucan x2 in case of yeast.  Continue triam.  She declines Medrol dose pack for now.  Return if symptoms worsen or fail to improve, for Next scheduled follow up.    Francia Staley, APRN  2025

## 2025-06-09 ENCOUNTER — TELEPHONE (OUTPATIENT)
Dept: LACTATION | Facility: HOSPITAL | Age: 40
End: 2025-06-09
Payer: COMMERCIAL

## 2025-06-09 NOTE — TELEPHONE ENCOUNTER
Denia called lactation clinic to set up an appointment for a follow-up to see if her and baby are on the right track.  She has no major concerns, but would like to see a consultant.    Mother stated that infant feeds a lot.  She is not using a nipple shield or has not started pumping. She is exclusively nursing right now.    Infant appointment is scheduled for 6/10.    Outpatient clinic appointment made for 6/18 at noon.    LC encouraged mother to continue nursing.

## 2025-06-10 ENCOUNTER — POSTPARTUM VISIT (OUTPATIENT)
Dept: OBSTETRICS AND GYNECOLOGY | Facility: CLINIC | Age: 40
End: 2025-06-10
Payer: COMMERCIAL

## 2025-06-10 VITALS
DIASTOLIC BLOOD PRESSURE: 70 MMHG | BODY MASS INDEX: 22.13 KG/M2 | WEIGHT: 141 LBS | SYSTOLIC BLOOD PRESSURE: 112 MMHG | HEIGHT: 67 IN

## 2025-06-10 RX ORDER — LEVOTHYROXINE SODIUM 75 UG/1
75 TABLET ORAL
COMMUNITY

## 2025-06-10 NOTE — PROGRESS NOTES
Chief Complaint   Patient presents with    Postpartum Care     Incision check       Postpartum Visit         Denia Gage is a 39 y.o.  who presents today for a 2 week(s) postpartum check.      C/S: repeat     , Low Transverse   Information for the patient's :  Luciana Gage [[id number]]   2025   female   Luciana Gage   3215 g (7 lb 1.4 oz)   Gestational Age: 39w0d     Baby Discharged with Mom  Delivering MD: Magui Gary MD.    Pregnancy complications: no known issues    Patient reports her incision is clean, dry, intact. Patient describes vaginal bleeding as moderate. She is breastfeeding. She would like to discuss the following complaints today: soreness at nipples-patient has follow up with lactation next week.    Patient had BTL with R c/s.     Patient denies concerns for postpartum depression/anxiety. Patient denies  suicidal or homicidal ideation. Her postpartum depression screening questionnaire: complete-scored 6. Patient admits to being tired but bonding well with baby. No treatment is indicated      The additional following portions of the patient's history were reviewed and updated as appropriate: allergies and current medications.      Review of Systems   Constitutional:  Negative for unexpected weight gain and unexpected weight loss.   HENT:  Negative for sore throat.    Eyes:  Negative for visual disturbance.   Respiratory:  Negative for shortness of breath.    Cardiovascular:  Negative for chest pain, palpitations and leg swelling.   Gastrointestinal:  Negative for abdominal pain, anal bleeding, blood in stool, constipation and nausea.   Genitourinary:  Positive for vaginal bleeding. Negative for breast lump, dysuria, frequency, urinary incontinence and vaginal discharge.   Musculoskeletal:  Negative for joint swelling.   Neurological:  Negative for headache.   Psychiatric/Behavioral:  Positive for sleep disturbance. The patient is not  "nervous/anxious.        I have reviewed and agree with the HPI, ROS, and historical information as entered above. Alva [facility] Jose Alberto, APRN      Objective   /70   Ht 170.2 cm (67\")   Wt 64 kg (141 lb)   LMP 2024   Breastfeeding Yes   BMI 22.08 kg/m²     Physical Exam  Vitals and nursing note reviewed. Exam conducted with a chaperone present.   Constitutional:       Appearance: She is well-developed.   HENT:      Head: Normocephalic and atraumatic.   Pulmonary:      Effort: Pulmonary effort is normal.   Abdominal:      General: A surgical scar is present.      Palpations: Abdomen is soft. Abdomen is not rigid.          Comments: Clean, Dry, and Intact.  No erythema. steristrips   Musculoskeletal:      Cervical back: Normal range of motion.   Neurological:      Mental Status: She is alert and oriented to person, place, and time.   Psychiatric:         Attention and Perception: Attention normal.         Mood and Affect: Mood normal.         Speech: Speech normal.         Behavior: Behavior normal. Behavior is cooperative.         Thought Content: Thought content normal. Thought content is not paranoid. Thought content does not include homicidal or suicidal ideation.         Cognition and Memory: Cognition normal.         Judgment: Judgment normal.              Assessment and Plan    Problem List Items Addressed This Visit    None  Visit Diagnoses         2 weeks postpartum follow-up    -  Primary            S/p , 2 week(s) postpartum.  Doing well.    Continued pelvic rest with a return to driving and light physical activity.  Baby doing well.  Breastfeeding going well.  No si/sx of postpartum depression  Lactation information given  Contraception: contraceptive methods: Tubal ligation  Encouraged lanolin use for nipples, will see lactation outpatient soon.   Follow up in four weeks     Alva Abrams, ZAHRA  06/10/2025    "

## 2025-06-11 ENCOUNTER — MATERNAL SCREENING (OUTPATIENT)
Dept: CALL CENTER | Facility: HOSPITAL | Age: 40
End: 2025-06-11
Payer: COMMERCIAL

## 2025-06-11 NOTE — OUTREACH NOTE
Maternal Screening Survey      Flowsheet Row Responses   Facility patient discharged from? [facility]   Attempt successful? Yes   Call start time 1012   Call end time 1014   Person spoke with today (if not patient) and relationship Patient   I have been able to laugh and see the funny side of things. 0   I have looked forward with enjoyment to things. 0   I have blamed myself unnecessarily when things went wrong. 0   I have been anxious or worried for no good reason. 0   I have felt scared or panicky for no good reason. 0   Things have been getting on top of me. 0   I have been so unhappy that I have had difficulty sleeping. 0   I have felt sad or miserable. 0   I have been so unhappy that I have been crying. 0   The thought of harming myself has occurred to me. 0   [place]  Depression Scale Total 0   Did any of your parents have problems with alcohol or drug use? No   Do any of your peers have problems with alcohol or drug use? No   Does your partner have problems with alcohol or drug use? No   Before you were pregnant did you have problems with alcohol or drug use? (past) No   In the past month, did you drink beer, wine, liquor or use any other drugs? (pregnancy) No   Maternal Screening call completed Yes   Call end time 1014              Andrey BARRIOS - Registered Nurse

## 2025-06-18 ENCOUNTER — HOSPITAL ENCOUNTER (OUTPATIENT)
Dept: LACTATION | Facility: HOSPITAL | Age: 40
Discharge: HOME | End: 2025-06-18

## 2025-06-18 NOTE — LACTATION NOTE
Denia Gage comes to the [facility] with a infant Luciana Gage ( 25) and grandparent for a weighted feeding check-in. Denia would like to be predominantly breastfeeding and has questions about introducing bottles and pumping. She has not pumped or provided any bottles to Luciana yet. Luciana breastfeeds 10-20 minutes per breast at roughly 9-12-3-6 per Moms On Call to provide routine for feedings. She reports Luciana has painful starts to latching, describing the latch as chomping, which improves as the breastfeeding continues.    Assessment:   Patient: Luciana Gage  : 2023  Current Age: 23 days  Gestational Age at birth: 39w0d  Pediatrician: LO  Birth weight : 7lb 1.4oz  Discharge weight : 6lb 11.5oz  Office weight 6/10: 7lb 6oz  Lactation weight : 7lb 12.6oz (3530g)    Minimum Transfer based on current weight (if feeding every 3 hrs, based on lbs x 2.5 = oz per 24hrs):   2.4oz (73ml)    During breastfeeding, deep latch is encouraged by bringing shoulders in with chin extension, this achieved a comfortable latch from the start of breastfeeding that lasted throughout. Luciana  from the right breast for 10min and transferred 45ml, then she was placed to the left breast for 14 min and transferred 20ml. She was still rooting and bringing her hands to her mouth and fussy, so she was then placed to the right breast for an additional 10 minutes and transferred 10ml, for an overall total of 75ml in 34 minutes of breastfeeding.     During breastfeeding, Luciana is noted to have a chompy/munchy latch with short bursts of suckles and lacking long/deep pulls at the breast. She does fatigue significantly during the breastfeeding time but responds to stimulation. Luciana is noted to have a small/recessed chin and significant tension causing her to sit totally upright when attempting burps.     Due to adequate weight gain and return to birth weight  around 2 weeks of age and current on course weight gain, milk production and transfer of breastmilk are currently deemed to be adequate and Denia and Luciana should continue breastfeeding on demand. Encouragement is given to following infant hunger cues and feeding when she is hungry/fussy, every 2-3 hours, for achieving a common breastfeeding frequency of 8-12x per 24hr period and maintaining milk production with frequent emptying of breasts.    Sidelying paced bottle feeding discussed with preemie or transition Dr Darwin jovel. Bottle feedings should last 15-20 minutes for a full 3oz feeding and pumping should replace breastfeeding sessions when bottle feeding for maintaining milk production. Pumping for bottles can be done by once daily morning pumping until supply is built up for bottle feeding 1x daily.     At this time, Luciana is rather disorganized when attempting to latch to pacifier. She required manually unrolling lips and providing firm chin support. Pacifier tug suckle training demonstrated to help her with learning to latch and organize a rhythm with bottle feeding. Exercises provided (choose [id number], each) to assist with coordination and pediatrician referral to speech language pathology encouraged if there is any difficulty with bottle feeding.    All questions answered at this time. As needed Outpatient Lactation Clinic contact encouraged for follow up questions and future weighted feedings.     [phone]   Maternal Information   Person Making Referral patient  (weighted feeding)   Maternal Reason for Referral other (see comments)  (pt reports pumping for first child (NICU admission) for 2 months; would prefer to breastfeed with this child)   Infant Reason for Referral  infant  (latch is pinchy at start, but improves with breastfeeding)   Maternal Assessment   Breast Size Issue none   Breast Shape Bilateral:;round   Breast Density Bilateral:;full   Nipples Bilateral:;everted   Left  Nipple Symptoms intact;nontender   Right Nipple Symptoms intact;nontender   Maternal Infant Feeding   Maternal Emotional State receptive;relaxed   Infant Positioning cross-cradle  (bilaterally)   Signs of Milk Transfer audible swallow;transfer present  (munchy suckle pattern throughout)   Pain with Feeding no  (per pt report, with position adjustments)   Comfort Measures Before/During Feeding infant position adjusted;latch adjusted;maternal position adjusted;suction broken using finger  (encouraged nipple to nose positioning and bringing baby to the breast by supporting shoulders for chin extension and unrolling lips for deeper latch)   Milk Ejection Reflex present   Comfort Measures Following Feeding air-drying encouraged;breast pads utilized   Prefeeding Nipple Condition pink   Postfeeding Nipple Condition pink   Nipple Shape After Feeding, Left Breast round;symmetrical;appropriately projected   Nipple Shape After Feeding, Right round;symmetrical;appropriately projected   Latch Assistance minimal assistance   Support Person Involvement other (see comments)  (grandparent present)   Additional Documentation Breastfeeding Supplementation (Group)   Breastfeeding Supplementation   Method of Supplementation paced bottle  (education discussed for when ready to introduce bottles)   Nipple Used For Supplementation transitional;straight/narrow;other (see comments)  (preemie or transition Dr Granados bottle recommended)   Feeding Infant   Feeding Readiness Cues crying;eager;finger sucking;hand to mouth movements;rooting   Satiety Cues infant releases breast;decreased number of sucks   Feeding Tolerance/Success arousal required;coordinated suck/swallow/breathing;eager;gulping;rooting;tongue thrusting;suck inconsistent   Feeding Physical Stress Cues fatigues quickly;gagging   Effective Latch During Feeding yes   Suck/Swallow/Breathing Coordination present   Skin-to-Skin Length of Time (minutes) 34   Skin-to-Skin Contact, Duration  minutes   Prefeeding Weight (gm) 3530 g (124.5 oz)   Postfeeding Weight (gm) 3605 g (127.2 oz)   Weight Gain/Loss (gm)  75 g (2.7 oz)   Breastfeeding Session   Breastfeeding breastfeeding, bilateral   Breastfeeding Time, Left (min) 2nd breast, 14min, 20ml transferred   Breastfeeding Time, Right (min) 1st breast, 10min, 45ml transferred; 3rd breastfeeding attempt due to hunger cues for additional 10 min and 10ml transferred   Infant-Driven Feeding Readiness©   Infant-Driven Feeding Scales - Readiness 2   Infant-Driven Feeding Scales - Quality 2   Infant-Driven Feeding Scales - Caregiver Strategies A   LATCH Score   Latch 1-->repeated attempts, holds nipple in mouth, stimulate to suck   Audible Swallowing 2-->spontaneous and intermittent (24 hrs old)   Type of Nipple 2-->everted (after stimulation)   Comfort (Breast/Nipple) 2-->soft/nontender   Hold (Positioning) 1-->minimal assist, teach one side, mother does other, staff holds   Latch Score 8   Milk Expression/Equipment   Breast Pump Type double electric, personal  ([place] and old Spectra S2 (new pump parts purchase encouraged))   Breast Pump Flange Size other (see comments)  (flange inserts recommended to size down to most comfortable fit (which will likely be less than 24mm))   Equipment for Home Use breast pump provided  (Spectra S2 provided through [employer] stock with instructions for use and sterilization bag given)   Breast Pumping   Breast Pumping Interventions post-feed pumping encouraged  (for introducing bottle-feeding when ready)   Lactation Referrals   Lactation Referrals outpatient lactation program   Outpatient Lactation Program Lactation Follow-up Date/Time encouraged, especially as volume needs are increasing

## 2025-07-09 ENCOUNTER — POSTPARTUM VISIT (OUTPATIENT)
Dept: OBSTETRICS AND GYNECOLOGY | Facility: CLINIC | Age: 40
End: 2025-07-09
Payer: COMMERCIAL

## 2025-07-09 VITALS
BODY MASS INDEX: 21.53 KG/M2 | SYSTOLIC BLOOD PRESSURE: 114 MMHG | WEIGHT: 137.2 LBS | HEIGHT: 67 IN | DIASTOLIC BLOOD PRESSURE: 68 MMHG

## 2025-07-09 DIAGNOSIS — E05.00 GRAVES DISEASE: Primary | Chronic | ICD-10-CM

## 2025-07-09 NOTE — PROGRESS NOTES
"        Chief Complaint   Patient presents with    Postpartum Care     6 weeks       Postpartum Visit         Denia Gage is a 39 y.o.  who presents today for a 6 week(s) postpartum check.     C/S: repeat     , Low Transverse   Information for the patient's :  Luciana Gage [[id number]]   2023   female   Luciana Gage   3215 g (7 lb 1.4 oz)   Gestational Age: 39w0d       Baby Discharged: Discharged with Mom  Delivering Physician: Magui Gary MD    Her pregnancy was complicated by no known issues. The incision is healing well. Patient describes vaginal bleeding as absent.  Patient is breastfeeding.  Patient had Bilateral Salpingectomy done at the time of C/Section.    She would like to discuss the following complaints today: none.    Patient denies concerns for postpartum depression/anxiety. Patient states she is just extremely tired and doesn't make her \"feel great\". Patient denies  suicidal or homicidal ideation. Her postpartum depression screening questionnaire: completed. No treatment is indicated      Last Pap : approx. 3 years ago Result: negative HPV: negative (Done at Dr. Darling office) .  Last Completed Pap Smear    This patient has no relevant Health Maintenance data.           The additional following portions of the patient's history were reviewed and updated as appropriate: allergies, current medications, past family history, past medical history, past social history, past surgical history, and problem list.    Review of Systems   Constitutional: Negative.    Respiratory: Negative.     Cardiovascular: Negative.    Gastrointestinal: Negative.    Genitourinary: Negative.    Musculoskeletal: Negative.    Neurological: Negative.    Psychiatric/Behavioral: Negative.       All other systems reviewed and are negative.     I have reviewed and agree with the HPI, ROS, and historical information as entered above. Angel Moran MD      /68   Ht " "170.2 cm (67.01\")   Wt 62.2 kg (137 lb 3.2 oz)   LMP 2024   Breastfeeding Yes   BMI 21.48 kg/m²     Physical Exam  Vitals reviewed.   Constitutional:       Appearance: Normal appearance.   HENT:      Head: Normocephalic and atraumatic.   Cardiovascular:      Rate and Rhythm: Normal rate and regular rhythm.   Pulmonary:      Effort: Pulmonary effort is normal.      Breath sounds: Normal breath sounds.   Abdominal:      General: Abdomen is flat.      Palpations: Abdomen is soft.   Musculoskeletal:      Cervical back: Neck supple.   Skin:     General: Skin is warm and dry.   Neurological:      Mental Status: She is alert and oriented to person, place, and time.   Psychiatric:         Mood and Affect: Mood normal.         Behavior: Behavior normal.             Assessment and Plan    Problem List Items Addressed This Visit       Graves disease - Primary (Chronic)    Overview   Was on methimazone and atenolol, followed with [facility].  - s/p thyroidectomy  - followed with ophthalmologist at [facility].          Relevant Medications    ibuprofen (ADVIL,MOTRIN) 600 MG tablet    levothyroxine (SYNTHROID, LEVOTHROID) 75 MCG tablet    Other Relevant Orders    CBC (No Diff)    Comprehensive Metabolic Panel    TSH    T4, Free       S/p , 6 week(s) postpartum.  Doing well.    Return to normal physical activity.  No pelvic restrictions.   Baby doing well.  Breastfeeding going well.  No si/sx of postpartum depression  Contraception: contraceptive methods: s/p salpingectomy  Return in about 1 year (around 2026) for Annual physical.     Angel Moran MD  2025   "

## 2025-07-10 LAB
ALBUMIN SERPL-MCNC: 4.5 G/DL (ref 3.5–5.2)
ALBUMIN/GLOB SERPL: 1.8 G/DL
ALP SERPL-CCNC: 85 U/L (ref 39–117)
ALT SERPL-CCNC: 28 U/L (ref 1–33)
AST SERPL-CCNC: 25 U/L (ref 1–32)
BILIRUB SERPL-MCNC: 0.3 MG/DL (ref 0–1.2)
BUN SERPL-MCNC: 8 MG/DL (ref 6–20)
BUN/CREAT SERPL: 12.5 (ref 7–25)
CALCIUM SERPL-MCNC: 9.4 MG/DL (ref 8.6–10.5)
CHLORIDE SERPL-SCNC: 104 MMOL/L (ref 98–107)
CO2 SERPL-SCNC: 26.8 MMOL/L (ref 22–29)
CREAT SERPL-MCNC: 0.64 MG/DL (ref 0.57–1)
EGFRCR SERPLBLD CKD-EPI 2021: 116.9 ML/MIN/1.73
ERYTHROCYTE [DISTWIDTH] IN BLOOD BY AUTOMATED COUNT: 12.3 % (ref 12.3–15.4)
GLOBULIN SER CALC-MCNC: 2.5 GM/DL
GLUCOSE SERPL-MCNC: 95 MG/DL (ref 65–99)
HCT VFR BLD AUTO: 39.9 % (ref 34–46.6)
HGB BLD-MCNC: 12.8 G/DL (ref 12–15.9)
MCH RBC QN AUTO: 29.3 PG (ref 26.6–33)
MCHC RBC AUTO-ENTMCNC: 32.1 G/DL (ref 31.5–35.7)
MCV RBC AUTO: 91.3 FL (ref 79–97)
PLATELET # BLD AUTO: 405 10*3/MM3 (ref 140–450)
POTASSIUM SERPL-SCNC: 4.3 MMOL/L (ref 3.5–5.2)
PROT SERPL-MCNC: 7 G/DL (ref 6–8.5)
RBC # BLD AUTO: 4.37 10*6/MM3 (ref 3.77–5.28)
SODIUM SERPL-SCNC: 141 MMOL/L (ref 136–145)
T4 FREE SERPL-MCNC: 1.52 NG/DL (ref 0.92–1.68)
TSH SERPL DL<=0.005 MIU/L-ACNC: 0.46 UIU/ML (ref 0.27–4.2)
WBC # BLD AUTO: 6.6 10*3/MM3 (ref 3.4–10.8)

## 2025-07-26 ENCOUNTER — TELEPHONE (OUTPATIENT)
Dept: LACTATION | Facility: HOSPITAL | Age: 40
End: 2025-07-26
Payer: COMMERCIAL

## 2025-07-26 NOTE — TELEPHONE ENCOUNTER
"Denia called and had lactation questions.  Luciana is doing well with exclusively breastfeeding.  She is 8 weeks old.  8-week pediatric appointment is scheduled for Wednesday, July 30th.    She is on a \"mom's on call schedule\" where Luciana feeds every three hours from 7 am to 7pm( which is 5x per day).  Mother also pumps x1 at night and is getting 4 ounces.  Luciana does at times wake up to feed and mother nurses her, which provides Luciana with another 1-2 feedings.    Mother inquiring if it is enough.  Denia stated that Luciana is fine.  She did state that when Luciana sleeps, in the morning her breasts are full, but not engorged, will pump and still get 3.5 to 4 ounces.    Encouraged mother that Luciana is the best pump and is nursing well.  Wet and dirty diapers and weight gain are adequate thus far.  Discussed growth spurts and Luciana cluster feeding during those times to get what she needs.  It appears that mother's milk production is adequate at this time.    Encouraged that if she has any concerns with feeding, milk intake, weight reduction, to contact pediatrician.    To call lactation after pediatric appointment if the needs arises.  Otherwise, encouraged to continue her routine with Luciana.    To call lactation PRN.        "

## 2025-07-28 RX ORDER — LEVOTHYROXINE SODIUM 75 UG/1
TABLET ORAL
Qty: 30 TABLET | Refills: 0 | Status: SHIPPED | OUTPATIENT
Start: 2025-07-28

## 2025-07-28 NOTE — TELEPHONE ENCOUNTER
Rx Refill Note  Requested Prescriptions     Pending Prescriptions Disp Refills    levothyroxine (SYNTHROID, LEVOTHROID) 75 MCG tablet [Pharmacy Med Name: LEVOTHYROXINE 0.075MG (75MCG) TABS] 30 tablet 0     Sig: TAKE 1 TABLET BY MOUTH DAILY. START THIS DOSE AFTER DELIVERY          Last office visit with prescribing clinician: 4/15/2025     Next office visit with prescribing clinician: 8/20/2025         Ct Qureshi MA  07/28/25, 09:32 EDT

## 2025-08-18 DIAGNOSIS — E89.0 POSTOPERATIVE HYPOTHYROIDISM: Primary | ICD-10-CM

## 2025-08-27 ENCOUNTER — OFFICE VISIT (OUTPATIENT)
Dept: ENDOCRINOLOGY | Facility: CLINIC | Age: 40
End: 2025-08-27
Payer: COMMERCIAL

## 2025-08-27 VITALS
SYSTOLIC BLOOD PRESSURE: 121 MMHG | HEIGHT: 67 IN | HEART RATE: 66 BPM | OXYGEN SATURATION: 97 % | DIASTOLIC BLOOD PRESSURE: 71 MMHG | BODY MASS INDEX: 21.19 KG/M2 | WEIGHT: 135 LBS

## 2025-08-27 DIAGNOSIS — E89.0 POSTOPERATIVE HYPOTHYROIDISM: Primary | ICD-10-CM

## 2025-08-27 PROCEDURE — 84439 ASSAY OF FREE THYROXINE: CPT | Performed by: STUDENT IN AN ORGANIZED HEALTH CARE EDUCATION/TRAINING PROGRAM

## 2025-08-27 PROCEDURE — 84443 ASSAY THYROID STIM HORMONE: CPT | Performed by: STUDENT IN AN ORGANIZED HEALTH CARE EDUCATION/TRAINING PROGRAM

## 2025-08-27 RX ORDER — CHOLECALCIFEROL (VITAMIN D3) 25 MCG
1000 TABLET ORAL DAILY
COMMUNITY

## 2025-08-28 ENCOUNTER — RESULTS FOLLOW-UP (OUTPATIENT)
Dept: ENDOCRINOLOGY | Facility: CLINIC | Age: 40
End: 2025-08-28
Payer: COMMERCIAL

## 2025-08-28 DIAGNOSIS — E89.0 POSTOPERATIVE HYPOTHYROIDISM: Primary | ICD-10-CM

## 2025-08-28 LAB
T4 FREE SERPL-MCNC: 1.43 NG/DL (ref 0.92–1.68)
TSH SERPL DL<=0.05 MIU/L-ACNC: 1.27 UIU/ML (ref 0.27–4.2)

## 2025-08-28 RX ORDER — LEVOTHYROXINE SODIUM 75 UG/1
75 TABLET ORAL
Qty: 30 TABLET | Refills: 8 | Status: SHIPPED | OUTPATIENT
Start: 2025-08-28

## (undated) DEVICE — 4-PORT MANIFOLD: Brand: NEPTUNE 2

## (undated) DEVICE — PK C/SECT 10

## (undated) DEVICE — SUT PDS 1 TP1 48IN Z880G BX/12

## (undated) DEVICE — GLV SURG PREMIERPRO GAMMEX NEOPRN PF SZ7 GRN

## (undated) DEVICE — PATIENT RETURN ELECTRODE, SINGLE-USE, CONTACT QUALITY MONITORING, ADULT, WITH 9FT CORD, FOR PATIENTS WEIGING OVER 33LBS. (15KG): Brand: MEGADYNE

## (undated) DEVICE — SOL IRR NACL 0.9PCT BT 1000ML

## (undated) DEVICE — SUT MNCRYL 3/0 27L Y523H BX/36

## (undated) DEVICE — SOL IRR H2O BTL 1000ML STRL

## (undated) DEVICE — SUT PLAIN  3/0 CT1 27IN 842H

## (undated) DEVICE — COATED VICRYL  (POLYGLACTIN 910) SUTURE, VIOLET BRAIDED, STERILE, SYNTHETIC ABSORBABLE SUTURE: Brand: COATED VICRYL

## (undated) DEVICE — SUT VIC 3/0 CT1 27IN DYED J338H

## (undated) DEVICE — VIOLET BRAIDED (POLYGLACTIN 910), SYNTHETIC ABSORBABLE SUTURE: Brand: COATED VICRYL

## (undated) DEVICE — CLTH CLENS READYCLEANSE PERI CARE PK/5

## (undated) DEVICE — PENCL SMOKE/EVAC MEGADYNE TELESCP 10FT

## (undated) DEVICE — SUT MNCRYL 1/0 CT1 36IN Y947H BX/36

## (undated) DEVICE — TRAP FLD MINIVAC MEGADYNE 100ML

## (undated) DEVICE — SUT VIC 2/0 CT1 27IN J339H BX/36

## (undated) DEVICE — STPLR SKIN SUBCUTICULAR INSORB 2030

## (undated) DEVICE — TRY SPINE BLCK WHITACRE 25G 3X5IN

## (undated) DEVICE — APPL CHLORAPREP TINTED 26ML TEAL

## (undated) DEVICE — SUT GUT CHRM 1 CTX 36IN 905H

## (undated) DEVICE — MAT PREVALON MOBL TRANSFR AIR W/PAD REPROC 39X81IN

## (undated) DEVICE — GLV SURG SENSICARE W/ALOE PF LF 6.5 STRL

## (undated) DEVICE — TBG PENCL TELESCP MEGADYNE SMOKE EVAC 10FT

## (undated) DEVICE — SOL IRR NACL 0.9PCT BO 1000ML

## (undated) DEVICE — ADHS SKIN PREMIERPRO EXOFIN TOPICAL HI/VISC .5ML

## (undated) DEVICE — SOL IRR H2O BO 1000ML STRL